# Patient Record
Sex: FEMALE | Race: WHITE | Employment: OTHER
[De-identification: names, ages, dates, MRNs, and addresses within clinical notes are randomized per-mention and may not be internally consistent; named-entity substitution may affect disease eponyms.]

---

## 2024-07-26 ENCOUNTER — APPOINTMENT (OUTPATIENT)
Facility: HOSPITAL | Age: 89
End: 2024-07-26
Payer: MEDICARE

## 2024-07-26 ENCOUNTER — APPOINTMENT (OUTPATIENT)
Facility: HOSPITAL | Age: 89
End: 2024-07-26
Attending: EMERGENCY MEDICINE
Payer: MEDICARE

## 2024-07-26 ENCOUNTER — HOSPITAL ENCOUNTER (EMERGENCY)
Facility: HOSPITAL | Age: 89
Discharge: HOME | End: 2024-07-26
Attending: EMERGENCY MEDICINE
Payer: MEDICARE

## 2024-07-26 VITALS
HEART RATE: 80 BPM | DIASTOLIC BLOOD PRESSURE: 76 MMHG | SYSTOLIC BLOOD PRESSURE: 140 MMHG | HEIGHT: 61 IN | WEIGHT: 145 LBS | BODY MASS INDEX: 27.38 KG/M2 | RESPIRATION RATE: 16 BRPM | TEMPERATURE: 98 F | OXYGEN SATURATION: 96 %

## 2024-07-26 DIAGNOSIS — S16.1XXA ACUTE STRAIN OF NECK MUSCLE, INITIAL ENCOUNTER: ICD-10-CM

## 2024-07-26 DIAGNOSIS — S80.01XA CONTUSION OF RIGHT KNEE, INITIAL ENCOUNTER: Primary | ICD-10-CM

## 2024-07-26 PROCEDURE — 70450 CT HEAD/BRAIN W/O DYE: CPT

## 2024-07-26 PROCEDURE — 71045 X-RAY EXAM CHEST 1 VIEW: CPT

## 2024-07-26 PROCEDURE — 72170 X-RAY EXAM OF PELVIS: CPT

## 2024-07-26 PROCEDURE — 73562 X-RAY EXAM OF KNEE 3: CPT

## 2024-07-26 PROCEDURE — 99284 EMERGENCY DEPT VISIT MOD MDM: CPT

## 2024-07-26 PROCEDURE — 72125 CT NECK SPINE W/O DYE: CPT

## 2024-07-26 RX ORDER — ATENOLOL 25 MG/1
25 TABLET ORAL DAILY
COMMUNITY
Start: 2024-05-08

## 2024-07-26 RX ORDER — LIDOCAINE 50 MG/G
1 PATCH TOPICAL DAILY
Qty: 10 PATCH | Refills: 0 | Status: SHIPPED | OUTPATIENT
Start: 2024-07-26 | End: 2024-08-05

## 2024-07-26 RX ORDER — CLONIDINE HYDROCHLORIDE 0.1 MG/1
0.1 TABLET ORAL EVERY 6 HOURS PRN
COMMUNITY

## 2024-07-26 RX ORDER — ENOXAPARIN SODIUM 300 MG/3ML
40 INJECTION INTRAVENOUS; SUBCUTANEOUS DAILY
COMMUNITY

## 2024-07-26 RX ORDER — LATANOPROST 50 UG/ML
1 SOLUTION/ DROPS OPHTHALMIC NIGHTLY
COMMUNITY

## 2024-07-26 RX ORDER — DILTIAZEM HYDROCHLORIDE 180 MG/1
180 CAPSULE, COATED, EXTENDED RELEASE ORAL DAILY
COMMUNITY
Start: 2024-07-08

## 2024-07-26 RX ORDER — ONDANSETRON 4 MG/1
4 TABLET, ORALLY DISINTEGRATING ORAL EVERY 6 HOURS PRN
COMMUNITY

## 2024-07-26 NOTE — ED PROVIDER NOTES
Place 1 patch onto the skin daily for 10 days 12 hours on, 12 hours off. 10 patch 0       Social Determinants of Health:   Social Determinants of Health     Tobacco Use: Not on file   Alcohol Use: Not At Risk (7/26/2024)    AUDIT-C     Frequency of Alcohol Consumption: Never     Average Number of Drinks: Patient does not drink     Frequency of Binge Drinking: Never   Financial Resource Strain: Not on file   Food Insecurity: Not on file   Transportation Needs: Not on file   Physical Activity: Not on file   Stress: Not on file   Social Connections: Not on file   Intimate Partner Violence: Not on file   Depression: Not on file   Housing Stability: Not on file   Interpersonal Safety: Not on file   Utilities: Not on file       PHYSICAL EXAM   Physical Exam  Constitutional:       Appearance: Normal appearance.   HENT:      Head: Normocephalic and atraumatic.      Right Ear: External ear normal.      Left Ear: External ear normal.      Nose: Nose normal.      Mouth/Throat:      Mouth: Mucous membranes are moist.   Eyes:      Extraocular Movements: Extraocular movements intact.      Conjunctiva/sclera: Conjunctivae normal.      Pupils: Pupils are equal, round, and reactive to light.   Neck:      Vascular: No carotid bruit.   Cardiovascular:      Rate and Rhythm: Normal rate and regular rhythm.      Pulses: Normal pulses.      Heart sounds: Normal heart sounds.   Pulmonary:      Effort: Pulmonary effort is normal.      Breath sounds: Normal breath sounds.   Abdominal:      General: Abdomen is flat. Bowel sounds are normal. There is no distension.      Palpations: Abdomen is soft. There is no mass.      Tenderness: There is no abdominal tenderness.      Hernia: No hernia is present.   Musculoskeletal:         General: No swelling, tenderness or signs of injury. Normal range of motion.      Cervical back: Normal range of motion and neck supple. No rigidity or tenderness.   Lymphadenopathy:      Cervical: No cervical

## 2024-08-04 ENCOUNTER — HOSPITAL ENCOUNTER (INPATIENT)
Facility: HOSPITAL | Age: 89
LOS: 3 days | Discharge: NURSING, REHAB OR LONG-TERM CARE | DRG: 394 | End: 2024-08-07
Admitting: INTERNAL MEDICINE
Payer: MEDICARE

## 2024-08-04 DIAGNOSIS — I48.11 LONGSTANDING PERSISTENT ATRIAL FIBRILLATION (HCC): ICD-10-CM

## 2024-08-04 DIAGNOSIS — K94.20 PROBLEM WITH GASTROSTOMY TUBE (HCC): Primary | ICD-10-CM

## 2024-08-04 PROBLEM — K94.23 GASTROSTOMY TUBE DYSFUNCTION (HCC): Status: ACTIVE | Noted: 2024-08-04

## 2024-08-04 PROBLEM — I48.21 PERMANENT ATRIAL FIBRILLATION (HCC): Status: ACTIVE | Noted: 2024-08-04

## 2024-08-04 LAB
ANION GAP SERPL CALC-SCNC: 5 MMOL/L (ref 5–15)
BASOPHILS # BLD: 0.1 K/UL (ref 0–0.1)
BASOPHILS NFR BLD: 1 % (ref 0–1)
BUN SERPL-MCNC: 25 MG/DL (ref 6–20)
BUN/CREAT SERPL: 37 (ref 12–20)
CA-I BLD-MCNC: 9.8 MG/DL (ref 8.5–10.1)
CHLORIDE SERPL-SCNC: 105 MMOL/L (ref 97–108)
CO2 SERPL-SCNC: 26 MMOL/L (ref 21–32)
CREAT SERPL-MCNC: 0.68 MG/DL (ref 0.55–1.02)
DIFFERENTIAL METHOD BLD: ABNORMAL
EOSINOPHIL # BLD: 0.1 K/UL (ref 0–0.4)
EOSINOPHIL NFR BLD: 1 % (ref 0–7)
ERYTHROCYTE [DISTWIDTH] IN BLOOD BY AUTOMATED COUNT: 13.1 % (ref 11.5–14.5)
GLUCOSE SERPL-MCNC: 113 MG/DL (ref 65–100)
HCT VFR BLD AUTO: 40.2 % (ref 35–47)
HGB BLD-MCNC: 13.2 G/DL (ref 11.5–16)
IMM GRANULOCYTES # BLD AUTO: 0 K/UL (ref 0–0.04)
IMM GRANULOCYTES NFR BLD AUTO: 0 % (ref 0–0.5)
LYMPHOCYTES # BLD: 1.2 K/UL (ref 0.8–3.5)
LYMPHOCYTES NFR BLD: 12 % (ref 12–49)
MAGNESIUM SERPL-MCNC: 2.3 MG/DL (ref 1.6–2.4)
MCH RBC QN AUTO: 31.5 PG (ref 26–34)
MCHC RBC AUTO-ENTMCNC: 32.8 G/DL (ref 30–36.5)
MCV RBC AUTO: 95.9 FL (ref 80–99)
MONOCYTES # BLD: 0.8 K/UL (ref 0–1)
MONOCYTES NFR BLD: 9 % (ref 5–13)
NEUTS SEG # BLD: 7.3 K/UL (ref 1.8–8)
NEUTS SEG NFR BLD: 77 % (ref 32–75)
NRBC # BLD: 0 K/UL (ref 0–0.01)
NRBC BLD-RTO: 0 PER 100 WBC
PLATELET # BLD AUTO: 386 K/UL (ref 150–400)
PMV BLD AUTO: 9.5 FL (ref 8.9–12.9)
POTASSIUM SERPL-SCNC: 5.3 MMOL/L (ref 3.5–5.1)
RBC # BLD AUTO: 4.19 M/UL (ref 3.8–5.2)
SODIUM SERPL-SCNC: 136 MMOL/L (ref 136–145)
WBC # BLD AUTO: 9.5 K/UL (ref 3.6–11)

## 2024-08-04 PROCEDURE — 96361 HYDRATE IV INFUSION ADD-ON: CPT

## 2024-08-04 PROCEDURE — 2580000003 HC RX 258

## 2024-08-04 PROCEDURE — 96360 HYDRATION IV INFUSION INIT: CPT

## 2024-08-04 PROCEDURE — 83735 ASSAY OF MAGNESIUM: CPT

## 2024-08-04 PROCEDURE — 99285 EMERGENCY DEPT VISIT HI MDM: CPT

## 2024-08-04 PROCEDURE — 80048 BASIC METABOLIC PNL TOTAL CA: CPT

## 2024-08-04 PROCEDURE — 85025 COMPLETE CBC W/AUTO DIFF WBC: CPT

## 2024-08-04 PROCEDURE — 93005 ELECTROCARDIOGRAM TRACING: CPT

## 2024-08-04 PROCEDURE — 36415 COLL VENOUS BLD VENIPUNCTURE: CPT

## 2024-08-04 PROCEDURE — 94761 N-INVAS EAR/PLS OXIMETRY MLT: CPT

## 2024-08-04 PROCEDURE — 1100000000 HC RM PRIVATE

## 2024-08-04 RX ORDER — SODIUM CHLORIDE 9 MG/ML
INJECTION, SOLUTION INTRAVENOUS CONTINUOUS
Status: DISCONTINUED | OUTPATIENT
Start: 2024-08-04 | End: 2024-08-07 | Stop reason: HOSPADM

## 2024-08-04 RX ORDER — METOPROLOL TARTRATE 1 MG/ML
5 INJECTION, SOLUTION INTRAVENOUS EVERY 6 HOURS PRN
Status: DISCONTINUED | OUTPATIENT
Start: 2024-08-04 | End: 2024-08-06

## 2024-08-04 RX ORDER — 0.9 % SODIUM CHLORIDE 0.9 %
500 INTRAVENOUS SOLUTION INTRAVENOUS ONCE
Status: COMPLETED | OUTPATIENT
Start: 2024-08-04 | End: 2024-08-04

## 2024-08-04 RX ADMIN — SODIUM CHLORIDE: 9 INJECTION, SOLUTION INTRAVENOUS at 22:21

## 2024-08-04 RX ADMIN — SODIUM CHLORIDE 500 ML: 9 INJECTION, SOLUTION INTRAVENOUS at 20:26

## 2024-08-04 ASSESSMENT — LIFESTYLE VARIABLES
HOW OFTEN DO YOU HAVE A DRINK CONTAINING ALCOHOL: NEVER
HOW MANY STANDARD DRINKS CONTAINING ALCOHOL DO YOU HAVE ON A TYPICAL DAY: PATIENT DOES NOT DRINK

## 2024-08-04 NOTE — ED TRIAGE NOTES
Patient reports via ambulance for peg tube clogged from nursing home.   EMS EKG found afib en route; stable. Currently on Diltiazem.

## 2024-08-05 ENCOUNTER — APPOINTMENT (OUTPATIENT)
Facility: HOSPITAL | Age: 89
DRG: 394 | End: 2024-08-05
Payer: MEDICARE

## 2024-08-05 LAB
ANION GAP SERPL CALC-SCNC: 5 MMOL/L (ref 5–15)
BASOPHILS # BLD: 0.1 K/UL (ref 0–0.1)
BASOPHILS NFR BLD: 1 % (ref 0–1)
BUN SERPL-MCNC: 27 MG/DL (ref 6–20)
BUN/CREAT SERPL: 39 (ref 12–20)
CA-I BLD-MCNC: 9.8 MG/DL (ref 8.5–10.1)
CHLORIDE SERPL-SCNC: 107 MMOL/L (ref 97–108)
CO2 SERPL-SCNC: 25 MMOL/L (ref 21–32)
CREAT SERPL-MCNC: 0.69 MG/DL (ref 0.55–1.02)
DIFFERENTIAL METHOD BLD: ABNORMAL
EOSINOPHIL # BLD: 0.1 K/UL (ref 0–0.4)
EOSINOPHIL NFR BLD: 1 % (ref 0–7)
ERYTHROCYTE [DISTWIDTH] IN BLOOD BY AUTOMATED COUNT: 13.2 % (ref 11.5–14.5)
GLUCOSE SERPL-MCNC: 114 MG/DL (ref 65–100)
HCT VFR BLD AUTO: 39.9 % (ref 35–47)
HGB BLD-MCNC: 12.9 G/DL (ref 11.5–16)
IMM GRANULOCYTES # BLD AUTO: 0 K/UL (ref 0–0.04)
IMM GRANULOCYTES NFR BLD AUTO: 0 % (ref 0–0.5)
LYMPHOCYTES # BLD: 1.1 K/UL (ref 0.8–3.5)
LYMPHOCYTES NFR BLD: 10 % (ref 12–49)
MCH RBC QN AUTO: 31.7 PG (ref 26–34)
MCHC RBC AUTO-ENTMCNC: 32.3 G/DL (ref 30–36.5)
MCV RBC AUTO: 98 FL (ref 80–99)
MONOCYTES # BLD: 0.9 K/UL (ref 0–1)
MONOCYTES NFR BLD: 8 % (ref 5–13)
NEUTS SEG # BLD: 8.6 K/UL (ref 1.8–8)
NEUTS SEG NFR BLD: 80 % (ref 32–75)
NRBC # BLD: 0 K/UL (ref 0–0.01)
NRBC BLD-RTO: 0 PER 100 WBC
PLATELET # BLD AUTO: 376 K/UL (ref 150–400)
PMV BLD AUTO: 10 FL (ref 8.9–12.9)
POTASSIUM SERPL-SCNC: 4.3 MMOL/L (ref 3.5–5.1)
RBC # BLD AUTO: 4.07 M/UL (ref 3.8–5.2)
SODIUM SERPL-SCNC: 137 MMOL/L (ref 136–145)
WBC # BLD AUTO: 10.7 K/UL (ref 3.6–11)

## 2024-08-05 PROCEDURE — 94761 N-INVAS EAR/PLS OXIMETRY MLT: CPT

## 2024-08-05 PROCEDURE — 2580000003 HC RX 258: Performed by: INTERNAL MEDICINE

## 2024-08-05 PROCEDURE — 85025 COMPLETE CBC W/AUTO DIFF WBC: CPT

## 2024-08-05 PROCEDURE — 80048 BASIC METABOLIC PNL TOTAL CA: CPT

## 2024-08-05 PROCEDURE — 74018 RADEX ABDOMEN 1 VIEW: CPT

## 2024-08-05 PROCEDURE — 1100000000 HC RM PRIVATE

## 2024-08-05 PROCEDURE — 36415 COLL VENOUS BLD VENIPUNCTURE: CPT

## 2024-08-05 PROCEDURE — 6360000002 HC RX W HCPCS: Performed by: INTERNAL MEDICINE

## 2024-08-05 PROCEDURE — 6360000004 HC RX CONTRAST MEDICATION: Performed by: INTERNAL MEDICINE

## 2024-08-05 RX ORDER — HYDROMORPHONE HYDROCHLORIDE 1 MG/ML
0.5 INJECTION, SOLUTION INTRAMUSCULAR; INTRAVENOUS; SUBCUTANEOUS EVERY 4 HOURS PRN
Status: DISPENSED | OUTPATIENT
Start: 2024-08-05 | End: 2024-08-07

## 2024-08-05 RX ORDER — POLYETHYLENE GLYCOL 3350 17 G/17G
17 POWDER, FOR SOLUTION ORAL DAILY PRN
Status: DISCONTINUED | OUTPATIENT
Start: 2024-08-05 | End: 2024-08-07 | Stop reason: HOSPADM

## 2024-08-05 RX ORDER — SODIUM CHLORIDE 0.9 % (FLUSH) 0.9 %
5-40 SYRINGE (ML) INJECTION EVERY 12 HOURS SCHEDULED
Status: DISCONTINUED | OUTPATIENT
Start: 2024-08-05 | End: 2024-08-07 | Stop reason: HOSPADM

## 2024-08-05 RX ORDER — POTASSIUM CHLORIDE 20 MEQ/1
40 TABLET, EXTENDED RELEASE ORAL PRN
Status: DISCONTINUED | OUTPATIENT
Start: 2024-08-05 | End: 2024-08-07 | Stop reason: HOSPADM

## 2024-08-05 RX ORDER — ACETAMINOPHEN 650 MG/1
650 SUPPOSITORY RECTAL EVERY 6 HOURS PRN
Status: DISCONTINUED | OUTPATIENT
Start: 2024-08-05 | End: 2024-08-07 | Stop reason: HOSPADM

## 2024-08-05 RX ORDER — ONDANSETRON 4 MG/1
4 TABLET, ORALLY DISINTEGRATING ORAL EVERY 8 HOURS PRN
Status: DISCONTINUED | OUTPATIENT
Start: 2024-08-05 | End: 2024-08-06

## 2024-08-05 RX ORDER — ACETAMINOPHEN 325 MG/1
650 TABLET ORAL EVERY 6 HOURS PRN
Status: DISCONTINUED | OUTPATIENT
Start: 2024-08-05 | End: 2024-08-07 | Stop reason: HOSPADM

## 2024-08-05 RX ORDER — POTASSIUM CHLORIDE 7.45 MG/ML
10 INJECTION INTRAVENOUS PRN
Status: DISCONTINUED | OUTPATIENT
Start: 2024-08-05 | End: 2024-08-07 | Stop reason: HOSPADM

## 2024-08-05 RX ORDER — SODIUM CHLORIDE 0.9 % (FLUSH) 0.9 %
5-40 SYRINGE (ML) INJECTION PRN
Status: DISCONTINUED | OUTPATIENT
Start: 2024-08-05 | End: 2024-08-07 | Stop reason: HOSPADM

## 2024-08-05 RX ORDER — ONDANSETRON 2 MG/ML
4 INJECTION INTRAMUSCULAR; INTRAVENOUS EVERY 6 HOURS PRN
Status: DISCONTINUED | OUTPATIENT
Start: 2024-08-05 | End: 2024-08-07 | Stop reason: HOSPADM

## 2024-08-05 RX ORDER — SODIUM CHLORIDE 9 MG/ML
INJECTION, SOLUTION INTRAVENOUS PRN
Status: DISCONTINUED | OUTPATIENT
Start: 2024-08-05 | End: 2024-08-07 | Stop reason: HOSPADM

## 2024-08-05 RX ORDER — MAGNESIUM SULFATE IN WATER 40 MG/ML
2000 INJECTION, SOLUTION INTRAVENOUS PRN
Status: DISCONTINUED | OUTPATIENT
Start: 2024-08-05 | End: 2024-08-07 | Stop reason: HOSPADM

## 2024-08-05 RX ADMIN — DIATRIZOATE MEGLUMINE AND DIATRIZOATE SODIUM 30 ML: 660; 100 LIQUID ORAL; RECTAL at 15:11

## 2024-08-05 RX ADMIN — SODIUM CHLORIDE, PRESERVATIVE FREE 10 ML: 5 INJECTION INTRAVENOUS at 22:33

## 2024-08-05 RX ADMIN — SODIUM CHLORIDE, PRESERVATIVE FREE 10 ML: 5 INJECTION INTRAVENOUS at 09:12

## 2024-08-05 RX ADMIN — HYDROMORPHONE HYDROCHLORIDE 0.5 MG: 1 INJECTION, SOLUTION INTRAMUSCULAR; INTRAVENOUS; SUBCUTANEOUS at 09:18

## 2024-08-05 ASSESSMENT — PAIN DESCRIPTION - LOCATION
LOCATION: BUTTOCKS
LOCATION: ABDOMEN

## 2024-08-05 ASSESSMENT — PAIN DESCRIPTION - DESCRIPTORS: DESCRIPTORS: ACHING

## 2024-08-05 ASSESSMENT — PAIN SCALES - GENERAL
PAINLEVEL_OUTOF10: 8
PAINLEVEL_OUTOF10: 8
PAINLEVEL_OUTOF10: 6

## 2024-08-05 NOTE — PLAN OF CARE
Problem: Discharge Planning  Goal: Discharge to home or other facility with appropriate resources  Outcome: Progressing     Problem: Skin/Tissue Integrity  Goal: Absence of new skin breakdown  Description: 1.  Monitor for areas of redness and/or skin breakdown  2.  Assess vascular access sites hourly  3.  Every 4-6 hours minimum:  Change oxygen saturation probe site  4.  Every 4-6 hours:  If on nasal continuous positive airway pressure, respiratory therapy assess nares and determine need for appliance change or resting period.  Outcome: Progressing     Problem: ABCDS Injury Assessment  Goal: Absence of physical injury  Outcome: Progressing     Problem: Neurosensory - Adult  Goal: Achieves stable or improved neurological status  Outcome: Progressing     Problem: Cardiovascular - Adult  Goal: Maintains optimal cardiac output and hemodynamic stability  Outcome: Progressing  Goal: Absence of cardiac dysrhythmias or at baseline  Outcome: Progressing     Problem: Skin/Tissue Integrity - Adult  Goal: Incisions, wounds, or drain sites healing without S/S of infection  Outcome: Progressing     Problem: Musculoskeletal - Adult  Goal: Return mobility to safest level of function  Outcome: Progressing     Problem: Genitourinary - Adult  Goal: Absence of urinary retention  Outcome: Progressing  Goal: Urinary catheter remains patent  Outcome: Progressing

## 2024-08-05 NOTE — ED PROVIDER NOTES
Comments: G-tube site is clean and intact   Musculoskeletal:         General: No swelling or signs of injury.      Cervical back: Neck supple.   Skin:     General: Skin is warm and dry.   Neurological:      Mental Status: She is alert. Mental status is at baseline.           SCREENINGS                No data recorded    LAB, EKG AND DIAGNOSTIC RESULTS   Labs:  Recent Results (from the past 12 hour(s))   BMP    Collection Time: 08/04/24  8:24 PM   Result Value Ref Range    Sodium 136 136 - 145 mmol/L    Potassium 5.3 (H) 3.5 - 5.1 mmol/L    Chloride 105 97 - 108 mmol/L    CO2 26 21 - 32 mmol/L    Anion Gap 5 5 - 15 mmol/L    Glucose 113 (H) 65 - 100 mg/dL    BUN 25 (H) 6 - 20 mg/dL    Creatinine 0.68 0.55 - 1.02 mg/dL    BUN/Creatinine Ratio 37 (H) 12 - 20      Est, Glom Filt Rate 83 >60 ml/min/1.73m2    Calcium 9.8 8.5 - 10.1 mg/dL   CBC with Auto Differential    Collection Time: 08/04/24  8:24 PM   Result Value Ref Range    WBC 9.5 3.6 - 11.0 K/uL    RBC 4.19 3.80 - 5.20 M/uL    Hemoglobin 13.2 11.5 - 16.0 g/dL    Hematocrit 40.2 35.0 - 47.0 %    MCV 95.9 80.0 - 99.0 FL    MCH 31.5 26.0 - 34.0 PG    MCHC 32.8 30.0 - 36.5 g/dL    RDW 13.1 11.5 - 14.5 %    Platelets 386 150 - 400 K/uL    MPV 9.5 8.9 - 12.9 FL    Nucleated RBCs 0.0 0.0  WBC    nRBC 0.00 0.00 - 0.01 K/uL    Neutrophils % 77 (H) 32 - 75 %    Lymphocytes % 12 12 - 49 %    Monocytes % 9 5 - 13 %    Eosinophils % 1 0 - 7 %    Basophils % 1 0 - 1 %    Immature Granulocytes % 0 0 - 0.5 %    Neutrophils Absolute 7.3 1.8 - 8.0 K/UL    Lymphocytes Absolute 1.2 0.8 - 3.5 K/UL    Monocytes Absolute 0.8 0.0 - 1.0 K/UL    Eosinophils Absolute 0.1 0.0 - 0.4 K/UL    Basophils Absolute 0.1 0.0 - 0.1 K/UL    Immature Granulocytes Absolute 0.0 0.00 - 0.04 K/UL    Differential Type AUTOMATED     Magnesium    Collection Time: 08/04/24  8:24 PM   Result Value Ref Range    Magnesium 2.3 1.6 - 2.4 mg/dL       EKG:.EKG interpreted by me. Shows atrial fibrillation with

## 2024-08-05 NOTE — ED NOTES
ED TO INPATIENT SBAR HANDOFF    Patient Name: Ciarra Rutherford   Preferred Name: Ciarra  : 1934  89 y.o.   Family/Caregiver Present: no   Code Status Order: No Order  PO Status: NPO:Yes  Telemetry Order:   C-SSRS: Risk of Suicide: No Risk  Sitter no   Restraints:     Sepsis Risk Score      Situation  Chief Complaint   Patient presents with    Peg Tube Clogged     Brief Description of Patient's Condition: Stable; PEG tube clogged. Per nursing home (via phone) PEG tube feeding was running via pump at 5p yesterday and they lost power. They are unsure when the feeding stopped and the tube has not been flushed since that time. No meds given; no flushes; no bolus feedings, or feedings via gravity. Nurse reports that they offered patient PO meds, but patient refused stating she cannot have meds by mouth.   Mental Status: oriented, alert, and coherent with moments of confusion  Arrived from:Assisted Living  Imaging:   No orders to display     Abnormal labs:   Abnormal Labs Reviewed   BASIC METABOLIC PANEL - Abnormal; Notable for the following components:       Result Value    Potassium 5.3 (*)     Glucose 113 (*)     BUN 25 (*)     BUN/Creatinine Ratio 37 (*)     All other components within normal limits   CBC WITH AUTO DIFFERENTIAL - Abnormal; Notable for the following components:    Neutrophils % 77 (*)     All other components within normal limits       Background  Allergies:   Allergies   Allergen Reactions    Iodine Swelling     History: No past medical history on file.    Assessment  Vitals:      There were no vitals filed for this visit.  Deterioration Index (DI): Deterioration Index: 26.73  Deterioration Index (DI) Interventions Performed:    O2 Flow Rate:    O2 Device:    Cardiac Rhythm:    Critical Lab Results: [unfilled]  Cultures: Cultures: None  NIH Score: NIH     Active LDA's:   Peripheral IV 24 Distal;Left;Anterior Forearm (Active)     Active Central Lines:                          Active Wounds:

## 2024-08-05 NOTE — CONSULTS
responsibility of hospitalitis/admitting physician to forward the discharge summary to patient's primary care physician and the primary gastroenterologist regarding further follow-up plan after patient be discharged.    note to patient:  The 21 st century Cures Act make the medical notes like this available to patient in the interest of transparency, however please be advised this is a medical document.  It is intended  as peer to peer communication. It is written in the medical language and may contain abbreviation or verbiage that are unfamiliar. It may appear blunt or direct.  Medical documents are intended to carry relevant information, facts as evident.  And the clinic opinions of the practitioner.  This note maybe transcribed  using a voice dictation system, voice-recognition errors may occur, this should not be taken to alter the contents or meaning for this note.      Cc Referring Physician

## 2024-08-05 NOTE — CARE COORDINATION
08/05/24 1153   Service Assessment   Patient Orientation Alert and Oriented   Cognition Alert   History Provided By Significant Other   Primary Caregiver Other (Comment)  (Patient was at SNF for 2-3 weeks per spouse)   Support Systems Spouse/Significant Other   Patient's Healthcare Decision Maker is: Legal Next of Kin   PCP Verified by CM Yes   Last Visit to PCP Within last 6 months   Prior Functional Level Assistance with the following:;Mobility   Current Functional Level Assistance with the following:;Bathing;Dressing;Toileting;Feeding;Cooking;Housework;Mobility;Shopping   Can patient return to prior living arrangement Yes   Ability to make needs known: Fair   Family able to assist with home care needs: Yes   Would you like for me to discuss the discharge plan with any other family members/significant others, and if so, who? Yes     Advance Care Planning     General Advance Care Planning (ACP) Conversation    Date of Conversation: 8/5/2024  Conducted with: Patient with Decision Making Capacity  Other persons present: None    Healthcare Decision Maker:   Primary Decision Maker: TJMARNIE CARO - Spouse - [phone]         Length of Voluntary ACP Conversation in minutes:  <16 minutes (Non-Billable)    Varinder Gill RN CM called YOLANDA Rutherford [phone].  He stated the patient was at a hospital for gall bladder surgery. Came home for 2 days, had difficulty swallowing, and went back to the hospital.  Then from there she went to [facility] for rehab.  Prior to hospitalizations she was at home at address on facesheet.  She did use a walker per spouse.  Spouse would like for patient to return to [facility] when discharged from the hospital. CM sent referral. Pending acceptance at this time.

## 2024-08-05 NOTE — H&P
V2.0  History and Physical      Name:  Ciarra Rutherford /Age/Sex: 1934  (89 y.o. female)   MRN & CSN:  [id number] & [id number] Encounter Date/Time: 2024 11:12 PM EDT   Location:  0303 PCP: None, None       Hospital Day: 1    Assessment and Plan:   Ciarra Rutherford is a 89 y.o. female with pmh of Parkinson's disease, hypertension, atrial fibrillation who presents with malfunction of G-tube from nursing home.     Hospital Problems             Last Modified POA    * (Principal) Gastrostomy tube dysfunction (HCC) 2024 Yes    Permanent atrial fibrillation (HCC) 2024 Yes         Principal Problem:    Gastrostomy tube dysfunction (HCC)  Plan:   Consult gastroenterology  N.p.o.    Active Problems:    Permanent atrial fibrillation (HCC)  Plan:   Appears patient is anticoagulated with subcu Lovenox.  In case procedure is necessary we will hold.  Monitor on telemetry  Lopressor 5 mg as needed for tachycardia       Admit to 23 Hour    Disposition:   Current Living situation: Nursing home  Expected Disposition: Nursing home  Estimated D/C: 1 day    Diet N.p.o.   DVT Prophylaxis [] Lovenox, []  Heparin, [x] SCDs, [] Ambulation,  [] Eliquis, [] Xarelto, [] Coumadin   Code Status DNR per nursing home paperwork   Surrogate Decision Maker/ POA Unknown     Personally reviewed Lab Studies and Imaging     Discussed management with the ED provider and agree with the plan for hospitalization    Bedside telemetry per my interpretation shows atrial fibrillation with a rate of 120+ per minute      History from:     patient, Quality of history:  vague    Prior records reviewed include prior office visits recorded in [facility] and nursing home paperwork      History of Present Illness:     Chief Complaint: G-tube malfunction    Ciarra Rutherford is a 89 y.o. female with pmh of Parkinson's disease, hypertension, atrial fibrillation who presents with malfunction of G-tube from nursing home.  Report from ER providers that family

## 2024-08-05 NOTE — PLAN OF CARE
Problem: Discharge Planning  Goal: Discharge to home or other facility with appropriate resources  8/5/2024 1324 by Harriet Adler RN  Outcome: Progressing  8/5/2024 0204 by Barbara Mon RN  Outcome: Progressing     Problem: Skin/Tissue Integrity  Goal: Absence of new skin breakdown  Description: 1.  Monitor for areas of redness and/or skin breakdown  2.  Assess vascular access sites hourly  3.  Every 4-6 hours minimum:  Change oxygen saturation probe site  4.  Every 4-6 hours:  If on nasal continuous positive airway pressure, respiratory therapy assess nares and determine need for appliance change or resting period.  8/5/2024 1324 by Harriet Adler RN  Outcome: Progressing  8/5/2024 0204 by Barbara Mon RN  Outcome: Progressing     Problem: ABCDS Injury Assessment  Goal: Absence of physical injury  8/5/2024 1324 by Harriet Adler RN  Outcome: Progressing  8/5/2024 0204 by Babrara Mon RN  Outcome: Progressing     Problem: Neurosensory - Adult  Goal: Achieves stable or improved neurological status  8/5/2024 1324 by Harriet Adler RN  Outcome: Progressing  8/5/2024 0204 by Barbara Mon RN  Outcome: Progressing     Problem: Cardiovascular - Adult  Goal: Maintains optimal cardiac output and hemodynamic stability  8/5/2024 1324 by Harriet Adler RN  Outcome: Progressing  8/5/2024 0204 by Barbara Mon RN  Outcome: Progressing  Goal: Absence of cardiac dysrhythmias or at baseline  8/5/2024 1324 by Harriet Adler RN  Outcome: Progressing  8/5/2024 0204 by Barbara Mon RN  Outcome: Progressing     Problem: Skin/Tissue Integrity - Adult  Goal: Incisions, wounds, or drain sites healing without S/S of infection  8/5/2024 1324 by Harriet Adler RN  Outcome: Progressing  8/5/2024 0204 by Barbara Mon RN  Outcome: Progressing     Problem: Musculoskeletal - Adult  Goal: Return mobility to safest level of

## 2024-08-06 ENCOUNTER — APPOINTMENT (OUTPATIENT)
Facility: HOSPITAL | Age: 89
DRG: 394 | End: 2024-08-06
Attending: INTERNAL MEDICINE
Payer: MEDICARE

## 2024-08-06 LAB
ALBUMIN SERPL-MCNC: 2.6 G/DL (ref 3.5–5)
ALBUMIN/GLOB SERPL: 0.6 (ref 1.1–2.2)
ALP SERPL-CCNC: 78 U/L (ref 45–117)
ALT SERPL-CCNC: 34 U/L (ref 12–78)
ANION GAP SERPL CALC-SCNC: 7 MMOL/L (ref 5–15)
AST SERPL W P-5'-P-CCNC: 20 U/L (ref 15–37)
BASOPHILS # BLD: 0.1 K/UL (ref 0–0.1)
BASOPHILS NFR BLD: 1 % (ref 0–1)
BILIRUB DIRECT SERPL-MCNC: 0.3 MG/DL (ref 0–0.2)
BILIRUB SERPL-MCNC: 0.9 MG/DL (ref 0.2–1)
BUN SERPL-MCNC: 29 MG/DL (ref 6–20)
BUN/CREAT SERPL: 41 (ref 12–20)
CA-I BLD-MCNC: 9.8 MG/DL (ref 8.5–10.1)
CHLORIDE SERPL-SCNC: 109 MMOL/L (ref 97–108)
CO2 SERPL-SCNC: 23 MMOL/L (ref 21–32)
CREAT SERPL-MCNC: 0.7 MG/DL (ref 0.55–1.02)
DIFFERENTIAL METHOD BLD: ABNORMAL
ECHO AO ASC DIAM: 2.2 CM
ECHO AO ASCENDING AORTA INDEX: 1.33 CM/M2
ECHO AO ROOT DIAM: 2.9 CM
ECHO AO ROOT INDEX: 1.76 CM/M2
ECHO AV AREA PEAK VELOCITY: 1.5 CM2
ECHO AV AREA VTI: 1.3 CM2
ECHO AV AREA/BSA PEAK VELOCITY: 0.9 CM2/M2
ECHO AV AREA/BSA VTI: 0.8 CM2/M2
ECHO AV MEAN GRADIENT: 8 MMHG
ECHO AV MEAN VELOCITY: 1.3 M/S
ECHO AV PEAK GRADIENT: 12 MMHG
ECHO AV PEAK VELOCITY: 1.7 M/S
ECHO AV VELOCITY RATIO: 0.41
ECHO AV VTI: 29.2 CM
ECHO BSA: 1.68 M2
ECHO EST RA PRESSURE: 8 MMHG
ECHO LA AREA 4C: 12 CM2
ECHO LA DIAMETER INDEX: 2.79 CM/M2
ECHO LA DIAMETER: 4.6 CM
ECHO LA MAJOR AXIS: 4.2 CM
ECHO LA TO AORTIC ROOT RATIO: 1.59
ECHO LA VOL MOD A4C: 27 ML (ref 22–52)
ECHO LA VOLUME INDEX MOD A4C: 16 ML/M2 (ref 16–34)
ECHO LV E' LATERAL VELOCITY: 11 CM/S
ECHO LV EDV A4C: 35 ML
ECHO LV EDV INDEX A4C: 21 ML/M2
ECHO LV EJECTION FRACTION A4C: 61 %
ECHO LV ESV A4C: 14 ML
ECHO LV ESV INDEX A4C: 8 ML/M2
ECHO LV FRACTIONAL SHORTENING: 29 % (ref 28–44)
ECHO LV INTERNAL DIMENSION DIASTOLE INDEX: 1.88 CM/M2
ECHO LV INTERNAL DIMENSION DIASTOLIC: 3.1 CM (ref 3.9–5.3)
ECHO LV INTERNAL DIMENSION SYSTOLIC INDEX: 1.33 CM/M2
ECHO LV INTERNAL DIMENSION SYSTOLIC: 2.2 CM
ECHO LV IVSD: 1.1 CM (ref 0.6–0.9)
ECHO LV MASS 2D: 99.7 G (ref 67–162)
ECHO LV MASS INDEX 2D: 60.4 G/M2 (ref 43–95)
ECHO LV POSTERIOR WALL DIASTOLIC: 1.1 CM (ref 0.6–0.9)
ECHO LV RELATIVE WALL THICKNESS RATIO: 0.71
ECHO LVOT AREA: 3.5 CM2
ECHO LVOT AV VTI INDEX: 0.38
ECHO LVOT DIAM: 2.1 CM
ECHO LVOT MEAN GRADIENT: 1 MMHG
ECHO LVOT PEAK GRADIENT: 2 MMHG
ECHO LVOT PEAK VELOCITY: 0.7 M/S
ECHO LVOT STROKE VOLUME INDEX: 23.5 ML/M2
ECHO LVOT SV: 38.8 ML
ECHO LVOT VTI: 11.2 CM
ECHO MV A VELOCITY: 0.4 M/S
ECHO MV E VELOCITY: 0.94 M/S
ECHO MV E/A RATIO: 2.35
ECHO MV E/E' LATERAL: 8.55
ECHO MV E/E' RATIO (AVERAGED): 9.49
ECHO MV E/E' SEPTAL: 10.44
ECHO PULMONARY ARTERY END DIASTOLIC PRESSURE: 11 MMHG
ECHO PV MAX VELOCITY: 0.9 M/S
ECHO PV MEAN GRADIENT: 2 MMHG
ECHO PV MEAN VELOCITY: 0.6 M/S
ECHO PV PEAK GRADIENT: 3 MMHG
ECHO PV REGURGITANT MAX VELOCITY: 1.7 M/S
ECHO PV VTI: 12.3 CM
ECHO RA AREA 4C: 28.4 CM2
ECHO RA END SYSTOLIC VOLUME APICAL 4 CHAMBER INDEX BSA: 59 ML/M2
ECHO RA VOLUME: 97 ML
ECHO RIGHT VENTRICULAR SYSTOLIC PRESSURE (RVSP): 41 MMHG
ECHO RV BASAL DIMENSION: 4.6 CM
ECHO RV MID DIMENSION: 3.2 CM
ECHO TV REGURGITANT MAX VELOCITY: 2.86 M/S
ECHO TV REGURGITANT PEAK GRADIENT: 33 MMHG
EKG ATRIAL RATE: 100 BPM
EKG DIAGNOSIS: NORMAL
EKG Q-T INTERVAL: 334 MS
EKG QRS DURATION: 80 MS
EKG QTC CALCULATION (BAZETT): 475 MS
EKG R AXIS: -32 DEGREES
EKG T AXIS: -74 DEGREES
EKG VENTRICULAR RATE: 122 BPM
EOSINOPHIL # BLD: 0.1 K/UL (ref 0–0.4)
EOSINOPHIL NFR BLD: 0 % (ref 0–7)
ERYTHROCYTE [DISTWIDTH] IN BLOOD BY AUTOMATED COUNT: 13.3 % (ref 11.5–14.5)
GLOBULIN SER CALC-MCNC: 4.6 G/DL (ref 2–4)
GLUCOSE BLD STRIP.AUTO-MCNC: 105 MG/DL (ref 65–100)
GLUCOSE BLD STRIP.AUTO-MCNC: 105 MG/DL (ref 65–100)
GLUCOSE SERPL-MCNC: 123 MG/DL (ref 65–100)
HCT VFR BLD AUTO: 40.3 % (ref 35–47)
HGB BLD-MCNC: 13.1 G/DL (ref 11.5–16)
IMM GRANULOCYTES # BLD AUTO: 0 K/UL (ref 0–0.04)
IMM GRANULOCYTES NFR BLD AUTO: 0 % (ref 0–0.5)
LYMPHOCYTES # BLD: 0.9 K/UL (ref 0.8–3.5)
LYMPHOCYTES NFR BLD: 8 % (ref 12–49)
MAGNESIUM SERPL-MCNC: 2 MG/DL (ref 1.6–2.4)
MCH RBC QN AUTO: 31.8 PG (ref 26–34)
MCHC RBC AUTO-ENTMCNC: 32.5 G/DL (ref 30–36.5)
MCV RBC AUTO: 97.8 FL (ref 80–99)
MONOCYTES # BLD: 0.8 K/UL (ref 0–1)
MONOCYTES NFR BLD: 7 % (ref 5–13)
NEUTS SEG # BLD: 9.7 K/UL (ref 1.8–8)
NEUTS SEG NFR BLD: 84 % (ref 32–75)
NRBC # BLD: 0 K/UL (ref 0–0.01)
NRBC BLD-RTO: 0 PER 100 WBC
PERFORMED BY:: ABNORMAL
PERFORMED BY:: ABNORMAL
PLATELET # BLD AUTO: 305 K/UL (ref 150–400)
PMV BLD AUTO: 10.1 FL (ref 8.9–12.9)
POTASSIUM SERPL-SCNC: 4.1 MMOL/L (ref 3.5–5.1)
PROT SERPL-MCNC: 7.2 G/DL (ref 6.4–8.2)
RBC # BLD AUTO: 4.12 M/UL (ref 3.8–5.2)
SODIUM SERPL-SCNC: 139 MMOL/L (ref 136–145)
WBC # BLD AUTO: 11.4 K/UL (ref 3.6–11)

## 2024-08-06 PROCEDURE — 82962 GLUCOSE BLOOD TEST: CPT

## 2024-08-06 PROCEDURE — C8929 TTE W OR WO FOL WCON,DOPPLER: HCPCS

## 2024-08-06 PROCEDURE — 80076 HEPATIC FUNCTION PANEL: CPT

## 2024-08-06 PROCEDURE — 2580000003 HC RX 258: Performed by: INTERNAL MEDICINE

## 2024-08-06 PROCEDURE — 1100000000 HC RM PRIVATE

## 2024-08-06 PROCEDURE — 85025 COMPLETE CBC W/AUTO DIFF WBC: CPT

## 2024-08-06 PROCEDURE — 2580000003 HC RX 258

## 2024-08-06 PROCEDURE — 36415 COLL VENOUS BLD VENIPUNCTURE: CPT

## 2024-08-06 PROCEDURE — 0DP6XUZ REMOVAL OF FEEDING DEVICE FROM STOMACH, EXTERNAL APPROACH: ICD-10-PCS | Performed by: INTERNAL MEDICINE

## 2024-08-06 PROCEDURE — 80048 BASIC METABOLIC PNL TOTAL CA: CPT

## 2024-08-06 PROCEDURE — 6360000002 HC RX W HCPCS: Performed by: INTERNAL MEDICINE

## 2024-08-06 PROCEDURE — 0DH63UZ INSERTION OF FEEDING DEVICE INTO STOMACH, PERCUTANEOUS APPROACH: ICD-10-PCS | Performed by: INTERNAL MEDICINE

## 2024-08-06 PROCEDURE — 6370000000 HC RX 637 (ALT 250 FOR IP): Performed by: INTERNAL MEDICINE

## 2024-08-06 PROCEDURE — 6360000004 HC RX CONTRAST MEDICATION

## 2024-08-06 PROCEDURE — 83735 ASSAY OF MAGNESIUM: CPT

## 2024-08-06 RX ORDER — ENOXAPARIN SODIUM 100 MG/ML
40 INJECTION SUBCUTANEOUS DAILY
Status: DISCONTINUED | OUTPATIENT
Start: 2024-08-06 | End: 2024-08-07 | Stop reason: HOSPADM

## 2024-08-06 RX ORDER — ONDANSETRON 4 MG/1
4 TABLET, ORALLY DISINTEGRATING ORAL EVERY 6 HOURS PRN
Status: DISCONTINUED | OUTPATIENT
Start: 2024-08-06 | End: 2024-08-07 | Stop reason: HOSPADM

## 2024-08-06 RX ORDER — DILTIAZEM HYDROCHLORIDE 180 MG/1
180 CAPSULE, COATED, EXTENDED RELEASE ORAL DAILY
Status: DISCONTINUED | OUTPATIENT
Start: 2024-08-06 | End: 2024-08-06

## 2024-08-06 RX ORDER — ATENOLOL 25 MG/1
25 TABLET ORAL DAILY
Status: DISCONTINUED | OUTPATIENT
Start: 2024-08-06 | End: 2024-08-07 | Stop reason: HOSPADM

## 2024-08-06 RX ORDER — DILTIAZEM HYDROCHLORIDE 180 MG/1
180 CAPSULE, EXTENDED RELEASE ORAL DAILY
Status: DISCONTINUED | OUTPATIENT
Start: 2024-08-06 | End: 2024-08-07 | Stop reason: HOSPADM

## 2024-08-06 RX ORDER — CLONIDINE HYDROCHLORIDE 0.1 MG/1
0.1 TABLET ORAL EVERY 6 HOURS PRN
Status: DISCONTINUED | OUTPATIENT
Start: 2024-08-06 | End: 2024-08-07 | Stop reason: HOSPADM

## 2024-08-06 RX ORDER — ENOXAPARIN SODIUM 300 MG/3ML
40 INJECTION INTRAVENOUS; SUBCUTANEOUS DAILY
Status: DISCONTINUED | OUTPATIENT
Start: 2024-08-06 | End: 2024-08-06

## 2024-08-06 RX ORDER — LATANOPROST 50 UG/ML
1 SOLUTION/ DROPS OPHTHALMIC NIGHTLY
Status: DISCONTINUED | OUTPATIENT
Start: 2024-08-06 | End: 2024-08-07 | Stop reason: HOSPADM

## 2024-08-06 RX ADMIN — CARBIDOPA AND LEVODOPA 2 TABLET: 25; 100 TABLET ORAL at 20:24

## 2024-08-06 RX ADMIN — ATENOLOL 25 MG: 25 TABLET ORAL at 15:01

## 2024-08-06 RX ADMIN — SODIUM CHLORIDE: 9 INJECTION, SOLUTION INTRAVENOUS at 15:08

## 2024-08-06 RX ADMIN — SODIUM CHLORIDE, PRESERVATIVE FREE 10 ML: 5 INJECTION INTRAVENOUS at 20:28

## 2024-08-06 RX ADMIN — HYDROMORPHONE HYDROCHLORIDE 0.5 MG: 1 INJECTION, SOLUTION INTRAMUSCULAR; INTRAVENOUS; SUBCUTANEOUS at 23:59

## 2024-08-06 RX ADMIN — ACETAMINOPHEN 650 MG: 325 TABLET ORAL at 20:24

## 2024-08-06 RX ADMIN — CARBIDOPA AND LEVODOPA 2 TABLET: 25; 100 TABLET ORAL at 15:01

## 2024-08-06 RX ADMIN — HYDROMORPHONE HYDROCHLORIDE 0.5 MG: 1 INJECTION, SOLUTION INTRAMUSCULAR; INTRAVENOUS; SUBCUTANEOUS at 09:05

## 2024-08-06 RX ADMIN — ENOXAPARIN SODIUM 40 MG: 100 INJECTION SUBCUTANEOUS at 15:02

## 2024-08-06 RX ADMIN — SODIUM CHLORIDE, PRESERVATIVE FREE 10 ML: 5 INJECTION INTRAVENOUS at 09:05

## 2024-08-06 RX ADMIN — PERFLUTREN 1 ML: 6.52 INJECTION, SUSPENSION INTRAVENOUS at 13:36

## 2024-08-06 ASSESSMENT — PAIN SCALES - GENERAL
PAINLEVEL_OUTOF10: 6
PAINLEVEL_OUTOF10: 0
PAINLEVEL_OUTOF10: 10
PAINLEVEL_OUTOF10: 8
PAINLEVEL_OUTOF10: 6

## 2024-08-06 ASSESSMENT — PAIN DESCRIPTION - ORIENTATION: ORIENTATION: POSTERIOR

## 2024-08-06 ASSESSMENT — PAIN DESCRIPTION - DESCRIPTORS: DESCRIPTORS: ACHING

## 2024-08-06 ASSESSMENT — PAIN - FUNCTIONAL ASSESSMENT: PAIN_FUNCTIONAL_ASSESSMENT: PREVENTS OR INTERFERES SOME ACTIVE ACTIVITIES AND ADLS

## 2024-08-06 ASSESSMENT — PAIN DESCRIPTION - FREQUENCY: FREQUENCY: INTERMITTENT

## 2024-08-06 ASSESSMENT — PAIN SCALES - WONG BAKER: WONGBAKER_NUMERICALRESPONSE: NO HURT

## 2024-08-06 ASSESSMENT — PAIN DESCRIPTION - PAIN TYPE: TYPE: ACUTE PAIN

## 2024-08-06 ASSESSMENT — PAIN DESCRIPTION - ONSET: ONSET: SUDDEN

## 2024-08-06 ASSESSMENT — PAIN DESCRIPTION - LOCATION
LOCATION: GENERALIZED
LOCATION: GENERALIZED

## 2024-08-06 NOTE — PLAN OF CARE
Problem: Discharge Planning  Goal: Discharge to home or other facility with appropriate resources  Outcome: Progressing     Problem: Skin/Tissue Integrity  Goal: Absence of new skin breakdown  Description: 1.  Monitor for areas of redness and/or skin breakdown  2.  Assess vascular access sites hourly  3.  Every 4-6 hours minimum:  Change oxygen saturation probe site  4.  Every 4-6 hours:  If on nasal continuous positive airway pressure, respiratory therapy assess nares and determine need for appliance change or resting period.  Outcome: Progressing     Problem: ABCDS Injury Assessment  Goal: Absence of physical injury  Outcome: Progressing     Problem: Neurosensory - Adult  Goal: Achieves stable or improved neurological status  Outcome: Progressing     Problem: Cardiovascular - Adult  Goal: Maintains optimal cardiac output and hemodynamic stability  Outcome: Progressing  Goal: Absence of cardiac dysrhythmias or at baseline  Outcome: Progressing     Problem: Skin/Tissue Integrity - Adult  Goal: Incisions, wounds, or drain sites healing without S/S of infection  Outcome: Progressing     Problem: Musculoskeletal - Adult  Goal: Return mobility to safest level of function  Outcome: Progressing     Problem: Genitourinary - Adult  Goal: Absence of urinary retention  Outcome: Progressing  Goal: Urinary catheter remains patent  Outcome: Progressing     Problem: Safety - Adult  Goal: Free from fall injury  Outcome: Progressing     Problem: Pain  Goal: Verbalizes/displays adequate comfort level or baseline comfort level  Outcome: Progressing

## 2024-08-06 NOTE — CARE COORDINATION
SHELBIE spoke with spouse Juan R Rutherford [phone].  SHELBIE informed him that we are anticipating discharge back to [facility] tomorrow.  Mr. Rutherford verbalized understanding. Mr. Rutherford states she will need stretcher transport back to the facility. IMM was given on 8/5/24.  Room assignment at [facility] has been given: Room # 22B.

## 2024-08-06 NOTE — CARE COORDINATION
Patient has been accepted to return to [facility] when medically stable for discharge.  Patient will need 3 midnight stay to return to facility which will be tomorrow.  Patients insurance recently changed from a managed medicare plan to traditional medicare, which is why the 3 midnight stay is needed per [facility].

## 2024-08-07 VITALS
TEMPERATURE: 97.2 F | DIASTOLIC BLOOD PRESSURE: 75 MMHG | BODY MASS INDEX: 27.3 KG/M2 | SYSTOLIC BLOOD PRESSURE: 135 MMHG | RESPIRATION RATE: 20 BRPM | HEART RATE: 100 BPM | WEIGHT: 144.62 LBS | OXYGEN SATURATION: 100 % | HEIGHT: 61 IN

## 2024-08-07 PROBLEM — K94.20 PROBLEM WITH GASTROSTOMY TUBE (HCC): Status: ACTIVE | Noted: 2024-08-07

## 2024-08-07 LAB
ALBUMIN SERPL-MCNC: 2.3 G/DL (ref 3.5–5)
ALBUMIN/GLOB SERPL: 0.5 (ref 1.1–2.2)
ALP SERPL-CCNC: 72 U/L (ref 45–117)
ALT SERPL-CCNC: 10 U/L (ref 12–78)
ANION GAP SERPL CALC-SCNC: 4 MMOL/L (ref 5–15)
AST SERPL W P-5'-P-CCNC: 16 U/L (ref 15–37)
BASOPHILS # BLD: 0.1 K/UL (ref 0–0.1)
BASOPHILS NFR BLD: 1 % (ref 0–1)
BILIRUB DIRECT SERPL-MCNC: 0.3 MG/DL (ref 0–0.2)
BILIRUB SERPL-MCNC: 1 MG/DL (ref 0.2–1)
BUN SERPL-MCNC: 33 MG/DL (ref 6–20)
BUN/CREAT SERPL: 45 (ref 12–20)
CA-I BLD-MCNC: 9.6 MG/DL (ref 8.5–10.1)
CHLORIDE SERPL-SCNC: 111 MMOL/L (ref 97–108)
CO2 SERPL-SCNC: 25 MMOL/L (ref 21–32)
CREAT SERPL-MCNC: 0.73 MG/DL (ref 0.55–1.02)
DIFFERENTIAL METHOD BLD: ABNORMAL
EOSINOPHIL # BLD: 0.2 K/UL (ref 0–0.4)
EOSINOPHIL NFR BLD: 2 % (ref 0–7)
ERYTHROCYTE [DISTWIDTH] IN BLOOD BY AUTOMATED COUNT: 13.7 % (ref 11.5–14.5)
GLOBULIN SER CALC-MCNC: 4.3 G/DL (ref 2–4)
GLUCOSE SERPL-MCNC: 92 MG/DL (ref 65–100)
HCT VFR BLD AUTO: 38.4 % (ref 35–47)
HGB BLD-MCNC: 12.3 G/DL (ref 11.5–16)
IMM GRANULOCYTES # BLD AUTO: 0.1 K/UL (ref 0–0.04)
IMM GRANULOCYTES NFR BLD AUTO: 0 % (ref 0–0.5)
LYMPHOCYTES # BLD: 0.8 K/UL (ref 0.8–3.5)
LYMPHOCYTES NFR BLD: 7 % (ref 12–49)
MAGNESIUM SERPL-MCNC: 1.9 MG/DL (ref 1.6–2.4)
MCH RBC QN AUTO: 32.1 PG (ref 26–34)
MCHC RBC AUTO-ENTMCNC: 32 G/DL (ref 30–36.5)
MCV RBC AUTO: 100.3 FL (ref 80–99)
MONOCYTES # BLD: 0.9 K/UL (ref 0–1)
MONOCYTES NFR BLD: 7 % (ref 5–13)
NEUTS SEG # BLD: 10 K/UL (ref 1.8–8)
NEUTS SEG NFR BLD: 83 % (ref 32–75)
NRBC # BLD: 0 K/UL (ref 0–0.01)
NRBC BLD-RTO: 0 PER 100 WBC
PLATELET # BLD AUTO: 253 K/UL (ref 150–400)
PMV BLD AUTO: 10.1 FL (ref 8.9–12.9)
POTASSIUM SERPL-SCNC: 3.9 MMOL/L (ref 3.5–5.1)
PROT SERPL-MCNC: 6.6 G/DL (ref 6.4–8.2)
RBC # BLD AUTO: 3.83 M/UL (ref 3.8–5.2)
SODIUM SERPL-SCNC: 140 MMOL/L (ref 136–145)
WBC # BLD AUTO: 12 K/UL (ref 3.6–11)

## 2024-08-07 PROCEDURE — 80076 HEPATIC FUNCTION PANEL: CPT

## 2024-08-07 PROCEDURE — 2500000003 HC RX 250 WO HCPCS: Performed by: INTERNAL MEDICINE

## 2024-08-07 PROCEDURE — 6370000000 HC RX 637 (ALT 250 FOR IP): Performed by: INTERNAL MEDICINE

## 2024-08-07 PROCEDURE — 83735 ASSAY OF MAGNESIUM: CPT

## 2024-08-07 PROCEDURE — 6360000002 HC RX W HCPCS: Performed by: INTERNAL MEDICINE

## 2024-08-07 PROCEDURE — 36415 COLL VENOUS BLD VENIPUNCTURE: CPT

## 2024-08-07 PROCEDURE — 2580000003 HC RX 258: Performed by: INTERNAL MEDICINE

## 2024-08-07 PROCEDURE — 80048 BASIC METABOLIC PNL TOTAL CA: CPT

## 2024-08-07 PROCEDURE — 85025 COMPLETE CBC W/AUTO DIFF WBC: CPT

## 2024-08-07 RX ORDER — LIDOCAINE 50 MG/G
1 PATCH TOPICAL DAILY
Qty: 10 PATCH | Refills: 0 | Status: SHIPPED | OUTPATIENT
Start: 2024-08-07 | End: 2024-08-17

## 2024-08-07 RX ADMIN — CARBIDOPA AND LEVODOPA 2 TABLET: 25; 100 TABLET ORAL at 08:43

## 2024-08-07 RX ADMIN — SODIUM CHLORIDE, PRESERVATIVE FREE 10 ML: 5 INJECTION INTRAVENOUS at 08:41

## 2024-08-07 RX ADMIN — ENOXAPARIN SODIUM 40 MG: 100 INJECTION SUBCUTANEOUS at 08:42

## 2024-08-07 RX ADMIN — ATENOLOL 25 MG: 25 TABLET ORAL at 08:43

## 2024-08-07 RX ADMIN — HYDROMORPHONE HYDROCHLORIDE 0.5 MG: 1 INJECTION, SOLUTION INTRAMUSCULAR; INTRAVENOUS; SUBCUTANEOUS at 08:40

## 2024-08-07 ASSESSMENT — PAIN DESCRIPTION - LOCATION: LOCATION: ABDOMEN

## 2024-08-07 ASSESSMENT — PAIN SCALES - GENERAL
PAINLEVEL_OUTOF10: 5
PAINLEVEL_OUTOF10: 8

## 2024-08-07 NOTE — PLAN OF CARE
Problem: Discharge Planning  Goal: Discharge to home or other facility with appropriate resources  8/7/2024 1357 by Hayley Westbrook RN  Outcome: Adequate for Discharge  8/7/2024 1254 by Hayley Westbrook RN  Outcome: Progressing  8/7/2024 0011 by Lee Chung LPN  Outcome: Progressing     Problem: Skin/Tissue Integrity  Goal: Absence of new skin breakdown  Description: 1.  Monitor for areas of redness and/or skin breakdown  2.  Assess vascular access sites hourly  3.  Every 4-6 hours minimum:  Change oxygen saturation probe site  4.  Every 4-6 hours:  If on nasal continuous positive airway pressure, respiratory therapy assess nares and determine need for appliance change or resting period.  8/7/2024 1357 by Hayley Westbrook RN  Outcome: Adequate for Discharge  8/7/2024 1254 by Hayley Westbrook RN  Outcome: Progressing  8/7/2024 0011 by Lee Chung LPN  Outcome: Progressing     Problem: ABCDS Injury Assessment  Goal: Absence of physical injury  8/7/2024 1357 by Hayley Westbrook RN  Outcome: Adequate for Discharge  8/7/2024 1254 by Hayley Westbrook RN  Outcome: Progressing  8/7/2024 0011 by Lee Chung LPN  Outcome: Progressing     Problem: Neurosensory - Adult  Goal: Achieves stable or improved neurological status  8/7/2024 1357 by Hayley Westbrook RN  Outcome: Adequate for Discharge  8/7/2024 1254 by Hayley Westbrook RN  Outcome: Progressing     Problem: Cardiovascular - Adult  Goal: Maintains optimal cardiac output and hemodynamic stability  8/7/2024 1357 by Hayley Westbrook RN  Outcome: Adequate for Discharge  8/7/2024 1254 by Hayley Westbrook RN  Outcome: Progressing  Goal: Absence of cardiac dysrhythmias or at baseline  8/7/2024 1357 by Hayley Westbrook RN  Outcome: Adequate for Discharge  8/7/2024 1254 by Hayley Westbrook RN  Outcome: Progressing     Problem: Skin/Tissue Integrity - Adult  Goal: Incisions, wounds, or drain sites healing without S/S of infection  8/7/2024 1357 by Hayley Westbrook

## 2024-08-07 NOTE — PROGRESS NOTES
Hospitalist Progress Note               Daily Progress Note: 8/5/2024      Hospital Day: 2     Chief complaint:   Chief Complaint   Patient presents with    Peg Tube Clogged        Subjective:   Hospital course to date:    Ciarra Rutherford is a 89 y.o. female with pmh of Parkinson's disease, hypertension, atrial fibrillation who presents with malfunction of G-tube from nursing home.  Report from ER providers that family believes this was placed approximately 4 weeks ago.  ER provider attempted to unclog without success.  There is some concern of how aggressive to get since it may be a fairly new tube.  Hospital service is asked to admit and consult gastroenterology to assist with declogging.     8/5/24 no new complaints, tolerating medications well  Gastro to see        Medications reviewed  Current Facility-Administered Medications   Medication Dose Route Frequency    sodium chloride flush 0.9 % injection 5-40 mL  5-40 mL IntraVENous 2 times per day    sodium chloride flush 0.9 % injection 5-40 mL  5-40 mL IntraVENous PRN    0.9 % sodium chloride infusion   IntraVENous PRN    potassium chloride (KLOR-CON M) extended release tablet 40 mEq  40 mEq Oral PRN    Or    potassium bicarb-citric acid (EFFER-K) effervescent tablet 40 mEq  40 mEq Oral PRN    Or    potassium chloride 10 mEq/100 mL IVPB (Peripheral Line)  10 mEq IntraVENous PRN    magnesium sulfate 2000 mg in 50 mL IVPB premix  2,000 mg IntraVENous PRN    ondansetron (ZOFRAN-ODT) disintegrating tablet 4 mg  4 mg Oral Q8H PRN    Or    ondansetron (ZOFRAN) injection 4 mg  4 mg IntraVENous Q6H PRN    polyethylene glycol (GLYCOLAX) packet 17 g  17 g Oral Daily PRN    acetaminophen (TYLENOL) tablet 650 mg  650 mg Oral Q6H PRN    Or    acetaminophen (TYLENOL) suppository 650 mg  650 mg Rectal Q6H PRN    HYDROmorphone HCl PF (DILAUDID) injection 0.5 mg  0.5 mg IntraVENous Q4H PRN    0.9 % sodium chloride infusion   IntraVENous Continuous    metoprolol (LOPRESSOR) 
.echo  
4 Eyes Skin Assessment     NAME:  Ciarra Rutherford  YOB: 1934  MEDICAL RECORD NUMBER:  [id number]    The patient is being assessed for  Other weekly assessment    I agree that at least one RN has performed a thorough Head to Toe Skin Assessment on the patient. ALL assessment sites listed below have been assessed.      Areas assessed by both nurses:    Head, Face, Ears, Shoulders, Back, Chest, Arms, Elbows, Hands, Sacrum. Buttock, Coccyx, Ischium, Legs. Feet and Heels, and Under Medical Devices         Does the Patient have a Wound? No noted wound(s)       Dusty Prevention initiated by RN: Yes  Wound Care Orders initiated by RN: No    Pressure Injury (Stage 3,4, Unstageable, DTI, NWPT, and Complex wounds) if present, place Wound referral order by RN under : No    New Ostomies, if present place, Ostomy referral order under : No     Nurse 1 eSignature: Electronically signed by Lee Chung LPN on 8/7/24 at 1:45 AM EDT    **SHARE this note so that the co-signing nurse can place an eSignature**    Nurse 2 eSignature: Electronically signed by ROMELIA JENSEN RN on 8/7/24 at 2:34 AM EDT    
4 Eyes Skin Assessment     NAME:  Ciarra Rutherford  YOB: 1934  MEDICAL RECORD NUMBER:  [id number]    The patient is being assessed for  Admission    I agree that at least one RN has performed a thorough Head to Toe Skin Assessment on the patient. ALL assessment sites listed below have been assessed.      Areas assessed by both nurses:    Head, Face, Ears, Shoulders, Back, Chest, Arms, Elbows, Hands, Sacrum. Buttock, Coccyx, Ischium, Legs. Feet and Heels, and Under Medical Devices         Does the Patient have a Wound? Yes wound(s) were present on assessment. LDA wound assessment was Initiated and completed by RN    Patient has small open areas to her sacrum and bilateral buttocks. Zinc cream and foam dressing applied. Patient has boggy redness to bilateral heels as well, foam dressings placed and floated on a pillow.       Dusty Prevention initiated by RN: Yes  Wound Care Orders initiated by RN: Yes    Pressure Injury (Stage 3,4, Unstageable, DTI, NWPT, and Complex wounds) if present, place Wound referral order by RN under : Yes    New Ostomies, if present place, Ostomy referral order under : No     Nurse 1 eSignature: Electronically signed by Barbara Mon RN on 8/5/24 at 1:54 AM EDT    **SHARE this note so that the co-signing nurse can place an eSignature**    Nurse 2 eSignature: Electronically signed by Denise Lewis LPN on 8/5/24 at 2:27 AM EDT    
Discharge paperwork complete, printed and put in folder at desk. Transport to be set up. Primary nurse aware.  
PT eval order received and acknowledged. PT eval attempted at 0848 however advised to hold per RN, pt in 10/10 pain this AM, just received pain medication and is now sleeping, difficult to arouse. Will continue to follow patient and attempt PT eval at a later time. Thank you.    
PT eval order received and acknowledged. PT eval attempted at 1515 however pt's resting -128 bpm. Will defer PT at this time d/t tachycardia. Pt visibly upset, repeating \"how would you feel if someone ripped something out of you?\" Will continue to follow patient and attempt PT eval at a later time when medically appropriate. Thank you.    
RRT Clinical Rounding Nurse Progress Report      SUBJECTIVE: Patient assessed secondary to elevated Deterioration Index Score.      Vitals:    08/04/24 2334 08/04/24 2344 08/05/24 0145 08/05/24 0158   BP: (!) 153/76 (!) 147/77 137/76    Pulse: (!) 115 (!) 111 (!) 118 (!) 113   Resp: 19 26     Temp:   97.5 °F (36.4 °C)    TempSrc:   Oral    SpO2:   97%         DETERIORATION INDEX SCORE: 54    ASSESSMENT:  Spoke with RUFINO Jimenez regarding elevated deterioration index. She will assess and follow up as needed.    PLAN:  Will follow per RRT Clinical Rounding Program protocol.    Kendrick Lewis RN  [facility]: [phone]  [facility]: [phone]  
Lymphocytes % 8 (L) 12 - 49 %    Monocytes % 7 5 - 13 %    Eosinophils % 0 0 - 7 %    Basophils % 1 0 - 1 %    Immature Granulocytes % 0 0 - 0.5 %    Neutrophils Absolute 9.7 (H) 1.8 - 8.0 K/UL    Lymphocytes Absolute 0.9 0.8 - 3.5 K/UL    Monocytes Absolute 0.8 0.0 - 1.0 K/UL    Eosinophils Absolute 0.1 0.0 - 0.4 K/UL    Basophils Absolute 0.1 0.0 - 0.1 K/UL    Immature Granulocytes Absolute 0.0 0.00 - 0.04 K/UL    Differential Type AUTOMATED     Hepatic Function Panel    Collection Time: 08/06/24  5:51 AM   Result Value Ref Range    Total Protein 7.2 6.4 - 8.2 g/dL    Albumin 2.6 (L) 3.5 - 5.0 g/dL    Globulin 4.6 (H) 2.0 - 4.0 g/dL    Albumin/Globulin Ratio 0.6 (L) 1.1 - 2.2      Total Bilirubin 0.9 0.2 - 1.0 mg/dL    Bilirubin, Direct 0.3 (H) 0.0 - 0.2 mg/dL    Alk Phosphatase 78 45 - 117 U/L    AST 20 15 - 37 U/L    ALT 34 12 - 78 U/L   Magnesium    Collection Time: 08/06/24  5:51 AM   Result Value Ref Range    Magnesium 2.0 1.6 - 2.4 mg/dL        XR ABDOMEN (KUB) (SINGLE AP VIEW)   Final Result   PEG tube within the gastric lumen         Electronically signed by Rudi Márquez         [unfilled]  Assessment:     Principal Problem:    Gastrostomy tube dysfunction (HCC)  Active Problems:    Permanent atrial fibrillation (HCC)  Resolved Problems:    * No resolved hospital problems. *  S/p peg replacement   KUB confirmed placement  Plan:     Start tube feeding   Sign off   Signed By: Lebron North MD     August 6, 2024          Thank you for allowing me to participate in this patients care    DISCLAIMER:  Please note that this report was generated to utilize Dragon Dictation system, the software is designed to speed over the accuracy.  Every effort has been done to attempt to correct this mistakes upon review, but there still might be some inadvertent errors in grammar and spelling.  Please utilize caution while reading the report due to this  inherent and potential for grammatical mistakes.    This physician  
portions of the lung apices appear clear.     1.  No acute fractures. 2.  Extensive multilevel degenerative changes of the cervical spine, especially involving the lower levels. Electronically signed by Zapstitch    CT HEAD WO CONTRAST    Result Date: 7/26/2024  EXAM: CT HEAD WO CONTRAST ACC#: [id number] INDICATION: trauma COMPARISON: None. TECHNIQUE: Routine CT of the head was performed without intravenous contrast. Coronal and sagittal reconstructions were generated. CT dose reduction was achieved through use of a standardized protocol tailored for this examination and automatic exposure control for dose modulation. FINDINGS: The ventricles are normal size and configuration. There is no mass, mass effect, or acute hemorrhage. There is no CT evidence of acute ischemia. There is periventricular hypodensity compatible with chronic small vessel disease.  There is generalized atrophy. There are no acute bony abnormalities. Paranasal sinuses and mastoid air cells appear well-aerated.     No acute intracranial abnormalities. Electronically signed by Zapstitch    XR CHEST PORTABLE    Result Date: 7/26/2024  EXAM: XR CHEST PORTABLE ACC#: [id number] INDICATION: trauma COMPARISON: None. TECHNIQUE: Portable frontal view of the chest. FINDINGS: Lungs are clear. The cardiomediastinal configuration is within normal limits. No acute bony abnormalities.     No acute cardiopulmonary abnormalities. Electronically signed by Zapstitch    XR KNEE RIGHT (3 VIEWS)    Result Date: 7/26/2024  EXAM: XR KNEE RIGHT (3 VIEWS) ACC#: [id number] INDICATION: trauma. Right knee pain. COMPARISON: None. TECHNIQUE: 3 view(s) of the right knee. FINDINGS: No acute fractures, dislocations, or radiopaque foreign bodies. Chondrocalcinosis is noted.     No acute bony abnormalities. Electronically signed by Zapstitch       Discussion/MDM:     [x] High (any 2)    A. Problems (any 1)  [x] Acute/Chronic Illness/injury posing threat to life or bodily

## 2024-08-07 NOTE — DISCHARGE SUMMARY
1.8 - 8.0 K/UL    Lymphocytes Absolute 0.8 0.8 - 3.5 K/UL    Monocytes Absolute 0.9 0.0 - 1.0 K/UL    Eosinophils Absolute 0.2 0.0 - 0.4 K/UL    Basophils Absolute 0.1 0.0 - 0.1 K/UL    Immature Granulocytes Absolute 0.1 (H) 0.00 - 0.04 K/UL    Differential Type AUTOMATED     Hepatic Function Panel    Collection Time: 08/07/24  5:59 AM   Result Value Ref Range    Total Protein 6.6 6.4 - 8.2 g/dL    Albumin 2.3 (L) 3.5 - 5.0 g/dL    Globulin 4.3 (H) 2.0 - 4.0 g/dL    Albumin/Globulin Ratio 0.5 (L) 1.1 - 2.2      Total Bilirubin 1.0 0.2 - 1.0 mg/dL    Bilirubin, Direct 0.3 (H) 0.0 - 0.2 mg/dL    Alk Phosphatase 72 45 - 117 U/L    AST 16 15 - 37 U/L    ALT 10 (L) 12 - 78 U/L   Magnesium    Collection Time: 08/07/24  5:59 AM   Result Value Ref Range    Magnesium 1.9 1.6 - 2.4 mg/dL     XR ABDOMEN (KUB) (SINGLE AP VIEW)   Final Result   PEG tube within the gastric lumen         Electronically signed by Rudi Márquez        Recent Results (from the past 336 hour(s))   EKG 12 Lead    Collection Time: 08/04/24  7:33 PM   Result Value Ref Range    Ventricular Rate 122 BPM    Atrial Rate 100 BPM    QRS Duration 80 ms    Q-T Interval 334 ms    QTc Calculation (Bazett) 475 ms    R Axis -32 degrees    T Axis -74 degrees    Diagnosis       Atrial fibrillation with rapid ventricular response with premature   ventricular or aberrantly conducted complexes  Left axis deviation  Low voltage QRS  Cannot rule out Anterior infarct , age undetermined  Abnormal ECG  No previous ECGs available  Confirmed by TYLER SEGURA ([id number]) on 8/6/2024 11:30:41 AM     BMP    Collection Time: 08/04/24  8:24 PM   Result Value Ref Range    Sodium 136 136 - 145 mmol/L    Potassium 5.3 (H) 3.5 - 5.1 mmol/L    Chloride 105 97 - 108 mmol/L    CO2 26 21 - 32 mmol/L    Anion Gap 5 5 - 15 mmol/L    Glucose 113 (H) 65 - 100 mg/dL    BUN 25 (H) 6 - 20 mg/dL    Creatinine 0.68 0.55 - 1.02 mg/dL    BUN/Creatinine Ratio 37 (H) 12 - 20      Est, Glom Filt Rate 83

## 2024-08-07 NOTE — PLAN OF CARE
Problem: Discharge Planning  Goal: Discharge to home or other facility with appropriate resources  8/7/2024 1254 by Hayley Westbrook RN  Outcome: Progressing  8/7/2024 0011 by Lee Chung LPN  Outcome: Progressing     Problem: Skin/Tissue Integrity  Goal: Absence of new skin breakdown  Description: 1.  Monitor for areas of redness and/or skin breakdown  2.  Assess vascular access sites hourly  3.  Every 4-6 hours minimum:  Change oxygen saturation probe site  4.  Every 4-6 hours:  If on nasal continuous positive airway pressure, respiratory therapy assess nares and determine need for appliance change or resting period.  8/7/2024 1254 by Hayley Westbrook RN  Outcome: Progressing  8/7/2024 0011 by Lee Chung LPN  Outcome: Progressing     Problem: ABCDS Injury Assessment  Goal: Absence of physical injury  8/7/2024 1254 by Hayley Westbrook RN  Outcome: Progressing  8/7/2024 0011 by Lee Chung LPN  Outcome: Progressing     Problem: Neurosensory - Adult  Goal: Achieves stable or improved neurological status  Outcome: Progressing     Problem: Cardiovascular - Adult  Goal: Maintains optimal cardiac output and hemodynamic stability  Outcome: Progressing  Goal: Absence of cardiac dysrhythmias or at baseline  Outcome: Progressing     Problem: Skin/Tissue Integrity - Adult  Goal: Incisions, wounds, or drain sites healing without S/S of infection  Outcome: Progressing     Problem: Musculoskeletal - Adult  Goal: Return mobility to safest level of function  Outcome: Progressing     Problem: Genitourinary - Adult  Goal: Absence of urinary retention  Outcome: Progressing  Goal: Urinary catheter remains patent  Outcome: Progressing     Problem: Safety - Adult  Goal: Free from fall injury  Outcome: Progressing     Problem: Pain  Goal: Verbalizes/displays adequate comfort level or baseline comfort level  Outcome: Progressing

## 2024-08-07 NOTE — CARE COORDINATION
Transition of Care Plan:    RUR: 13%  Prior Level of Functioning: Was in SNF for short term rehab.   Disposition: SNF  If SNF or IPR: Date FOC offered: 8/5/24  Date FOC received: 8/5/24  Accepting facility: [facility]   Date authorization started with reference number: NA  Date authorization received and expires: NA  Follow up appointments: SNF to arrange  DME needed:   Transportation at discharge: [facility] assisting with Stretcher transport.   IM/IMM Medicare/ letter given: YES on 8/5/24  Is patient a  and connected with [facility]? No   If yes, was [place] transfer form completed and [facility] notified? NA  Caregiver Contact: Juan R Rutherford [phone].   Discharge Caregiver contacted prior to discharge? Yes   Care Conference needed? no  Barriers to discharge: none    Patient has been accepted to [facility] located at [address] [place] [address]. Patient will go to room 22B.  Nurse to call report to [phone]. Patient will need stretcher transport.

## 2024-08-18 ENCOUNTER — APPOINTMENT (OUTPATIENT)
Facility: HOSPITAL | Age: 89
End: 2024-08-18
Payer: MEDICARE

## 2024-08-18 ENCOUNTER — HOSPITAL ENCOUNTER (EMERGENCY)
Facility: HOSPITAL | Age: 89
Discharge: HOME | End: 2024-08-19
Attending: EMERGENCY MEDICINE
Payer: MEDICARE
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
## 2024-08-18 LAB
ALBUMIN SERPL-MCNC: 2.2 G/DL (ref 3.5–5)
ALBUMIN/GLOB SERPL: 0.4 (ref 1.1–2.2)
ALP SERPL-CCNC: 82 U/L (ref 45–117)
ALT SERPL-CCNC: 26 U/L (ref 12–78)
ANION GAP SERPL CALC-SCNC: 5 MMOL/L (ref 5–15)
AST SERPL W P-5'-P-CCNC: 20 U/L (ref 15–37)
BASOPHILS # BLD: 0 K/UL (ref 0–0.1)
BASOPHILS NFR BLD: 0 % (ref 0–1)
BILIRUB SERPL-MCNC: 0.5 MG/DL (ref 0.2–1)
BUN SERPL-MCNC: 28 MG/DL (ref 6–20)
BUN/CREAT SERPL: 36 (ref 12–20)
CA-I BLD-MCNC: 10.1 MG/DL (ref 8.5–10.1)
CHLORIDE SERPL-SCNC: 104 MMOL/L (ref 97–108)
CO2 SERPL-SCNC: 32 MMOL/L (ref 21–32)
CREAT SERPL-MCNC: 0.77 MG/DL (ref 0.55–1.02)
DIFFERENTIAL METHOD BLD: ABNORMAL
EKG ATRIAL RATE: 326 BPM
EKG DIAGNOSIS: NORMAL
EKG Q-T INTERVAL: 328 MS
EKG QRS DURATION: 80 MS
EKG QTC CALCULATION (BAZETT): 433 MS
EKG R AXIS: 66 DEGREES
EKG T AXIS: 128 DEGREES
EKG VENTRICULAR RATE: 105 BPM
EOSINOPHIL # BLD: 0.2 K/UL (ref 0–0.4)
EOSINOPHIL NFR BLD: 2 % (ref 0–7)
ERYTHROCYTE [DISTWIDTH] IN BLOOD BY AUTOMATED COUNT: 14 % (ref 11.5–14.5)
GLOBULIN SER CALC-MCNC: 5.3 G/DL (ref 2–4)
GLUCOSE SERPL-MCNC: 115 MG/DL (ref 65–100)
HCT VFR BLD AUTO: 39.3 % (ref 35–47)
HGB BLD-MCNC: 12.6 G/DL (ref 11.5–16)
IMM GRANULOCYTES # BLD AUTO: 0.1 K/UL (ref 0–0.04)
IMM GRANULOCYTES NFR BLD AUTO: 1 % (ref 0–0.5)
INR PPP: 1.1 (ref 0.9–1.1)
LYMPHOCYTES # BLD: 1.2 K/UL (ref 0.8–3.5)
LYMPHOCYTES NFR BLD: 10 % (ref 12–49)
MCH RBC QN AUTO: 31.9 PG (ref 26–34)
MCHC RBC AUTO-ENTMCNC: 32.1 G/DL (ref 30–36.5)
MCV RBC AUTO: 99.5 FL (ref 80–99)
MONOCYTES # BLD: 0.6 K/UL (ref 0–1)
MONOCYTES NFR BLD: 5 % (ref 5–13)
NEUTS SEG # BLD: 9.5 K/UL (ref 1.8–8)
NEUTS SEG NFR BLD: 82 % (ref 32–75)
NRBC # BLD: 0 K/UL (ref 0–0.01)
NRBC BLD-RTO: 0 PER 100 WBC
PLATELET # BLD AUTO: 339 K/UL (ref 150–400)
PMV BLD AUTO: 10.3 FL (ref 8.9–12.9)
POTASSIUM SERPL-SCNC: 4.1 MMOL/L (ref 3.5–5.1)
PROT SERPL-MCNC: 7.5 G/DL (ref 6.4–8.2)
PROTHROMBIN TIME: 14.1 SEC (ref 11.9–14.6)
RBC # BLD AUTO: 3.95 M/UL (ref 3.8–5.2)
SODIUM SERPL-SCNC: 141 MMOL/L (ref 136–145)
WBC # BLD AUTO: 11.6 K/UL (ref 3.6–11)

## 2024-08-18 PROCEDURE — 73502 X-RAY EXAM HIP UNI 2-3 VIEWS: CPT

## 2024-08-18 PROCEDURE — 70450 CT HEAD/BRAIN W/O DYE: CPT

## 2024-08-18 PROCEDURE — 96374 THER/PROPH/DIAG INJ IV PUSH: CPT

## 2024-08-18 PROCEDURE — 73030 X-RAY EXAM OF SHOULDER: CPT

## 2024-08-18 PROCEDURE — 93005 ELECTROCARDIOGRAM TRACING: CPT | Performed by: EMERGENCY MEDICINE

## 2024-08-18 PROCEDURE — 36415 COLL VENOUS BLD VENIPUNCTURE: CPT

## 2024-08-18 PROCEDURE — 72125 CT NECK SPINE W/O DYE: CPT

## 2024-08-18 PROCEDURE — 85025 COMPLETE CBC W/AUTO DIFF WBC: CPT

## 2024-08-18 PROCEDURE — 94761 N-INVAS EAR/PLS OXIMETRY MLT: CPT

## 2024-08-18 PROCEDURE — 80053 COMPREHEN METABOLIC PANEL: CPT

## 2024-08-18 PROCEDURE — 85610 PROTHROMBIN TIME: CPT

## 2024-08-18 PROCEDURE — 99285 EMERGENCY DEPT VISIT HI MDM: CPT

## 2024-08-18 PROCEDURE — 6360000002 HC RX W HCPCS: Performed by: EMERGENCY MEDICINE

## 2024-08-18 RX ORDER — ACETAMINOPHEN 500 MG
1000 TABLET ORAL
Status: DISCONTINUED | OUTPATIENT
Start: 2024-08-18 | End: 2024-08-19 | Stop reason: HOSPADM

## 2024-08-18 RX ORDER — MORPHINE SULFATE 2 MG/ML
2 INJECTION, SOLUTION INTRAMUSCULAR; INTRAVENOUS
Status: COMPLETED | OUTPATIENT
Start: 2024-08-18 | End: 2024-08-18

## 2024-08-18 RX ADMIN — MORPHINE SULFATE 2 MG: 2 INJECTION, SOLUTION INTRAMUSCULAR; INTRAVENOUS at 19:39

## 2024-08-18 ASSESSMENT — PAIN SCALES - GENERAL: PAINLEVEL_OUTOF10: 10

## 2024-08-18 ASSESSMENT — LIFESTYLE VARIABLES
HOW MANY STANDARD DRINKS CONTAINING ALCOHOL DO YOU HAVE ON A TYPICAL DAY: PATIENT DOES NOT DRINK
HOW OFTEN DO YOU HAVE A DRINK CONTAINING ALCOHOL: NEVER

## 2024-08-18 ASSESSMENT — PAIN - FUNCTIONAL ASSESSMENT: PAIN_FUNCTIONAL_ASSESSMENT: 0-10

## 2024-08-18 NOTE — ED PROVIDER NOTES
11.6 (H) 3.6 - 11.0 K/uL    RBC 3.95 3.80 - 5.20 M/uL    Hemoglobin 12.6 11.5 - 16.0 g/dL    Hematocrit 39.3 35.0 - 47.0 %    MCV 99.5 (H) 80.0 - 99.0 FL    MCH 31.9 26.0 - 34.0 PG    MCHC 32.1 30.0 - 36.5 g/dL    RDW 14.0 11.5 - 14.5 %    Platelets 339 150 - 400 K/uL    MPV 10.3 8.9 - 12.9 FL    Nucleated RBCs 0.0 0.0  WBC    nRBC 0.00 0.00 - 0.01 K/uL    Neutrophils % 82 (H) 32 - 75 %    Lymphocytes % 10 (L) 12 - 49 %    Monocytes % 5 5 - 13 %    Eosinophils % 2 0 - 7 %    Basophils % 0 0 - 1 %    Immature Granulocytes % 1 (H) 0 - 0.5 %    Neutrophils Absolute 9.5 (H) 1.8 - 8.0 K/UL    Lymphocytes Absolute 1.2 0.8 - 3.5 K/UL    Monocytes Absolute 0.6 0.0 - 1.0 K/UL    Eosinophils Absolute 0.2 0.0 - 0.4 K/UL    Basophils Absolute 0.0 0.0 - 0.1 K/UL    Immature Granulocytes Absolute 0.1 (H) 0.00 - 0.04 K/UL    Differential Type AUTOMATED     Comprehensive Metabolic Panel    Collection Time: 08/18/24  5:10 PM   Result Value Ref Range    Sodium 141 136 - 145 mmol/L    Potassium 4.1 3.5 - 5.1 mmol/L    Chloride 104 97 - 108 mmol/L    CO2 32 21 - 32 mmol/L    Anion Gap 5 5 - 15 mmol/L    Glucose 115 (H) 65 - 100 mg/dL    BUN 28 (H) 6 - 20 mg/dL    Creatinine 0.77 0.55 - 1.02 mg/dL    BUN/Creatinine Ratio 36 (H) 12 - 20      Est, Glom Filt Rate 73 >60 ml/min/1.73m2    Calcium 10.1 8.5 - 10.1 mg/dL    Total Bilirubin 0.5 0.2 - 1.0 mg/dL    AST 20 15 - 37 U/L    ALT 26 12 - 78 U/L    Alk Phosphatase 82 45 - 117 U/L    Total Protein 7.5 6.4 - 8.2 g/dL    Albumin 2.2 (L) 3.5 - 5.0 g/dL    Globulin 5.3 (H) 2.0 - 4.0 g/dL    Albumin/Globulin Ratio 0.4 (L) 1.1 - 2.2     Protime-INR    Collection Time: 08/18/24  5:10 PM   Result Value Ref Range    Protime 14.1 11.9 - 14.6 sec    INR 1.1 0.9 - 1.1         EKG:.Not Applicable    Radiologic Studies:  Non-plain film images such as CT, Ultrasound and MRI are read by the radiologist. Plain radiographic images are visualized and preliminarily interpreted by the ED Provider with

## 2024-08-19 VITALS
OXYGEN SATURATION: 100 % | RESPIRATION RATE: 16 BRPM | SYSTOLIC BLOOD PRESSURE: 125 MMHG | WEIGHT: 140 LBS | TEMPERATURE: 97.8 F | DIASTOLIC BLOOD PRESSURE: 64 MMHG | HEIGHT: 61 IN | HEART RATE: 81 BPM | BODY MASS INDEX: 26.43 KG/M2

## 2024-08-19 NOTE — DISCHARGE INSTRUCTIONS
Thank you for choosing our Emergency Department for your care.  It is our privilege to care for you in your time of need.  In the next several days, you may receive a survey via email or mailed to your home about your experience with our team.  We would greatly appreciate you taking a few minutes to complete the survey, as we use this information to learn what we have done well and what we could be doing better. Thank you for trusting us with your care!    Below you will find a list of your tests from today's visit.   Labs  Recent Results (from the past 12 hour(s))   EKG 12 Lead    Collection Time: 08/18/24  4:58 PM   Result Value Ref Range    Ventricular Rate 105 BPM    Atrial Rate 326 BPM    QRS Duration 80 ms    Q-T Interval 328 ms    QTc Calculation (Bazett) 433 ms    R Axis 66 degrees    T Axis 128 degrees    Diagnosis       Atrial fibrillation with rapid ventricular response with premature   ventricular or aberrantly conducted complexes  Low voltage QRS  Cannot rule out Anterior infarct (cited on or before 04-AUG-2024)  ST & T wave abnormality, consider lateral ischemia  Abnormal ECG  When compared with ECG of 04-AUG-2024 19:33,  QRS axis Shifted right    Confirmed by DIANA CORRAL MD ([id number]) on 8/18/2024 6:37:09 PM     CBC with Auto Differential    Collection Time: 08/18/24  5:10 PM   Result Value Ref Range    WBC 11.6 (H) 3.6 - 11.0 K/uL    RBC 3.95 3.80 - 5.20 M/uL    Hemoglobin 12.6 11.5 - 16.0 g/dL    Hematocrit 39.3 35.0 - 47.0 %    MCV 99.5 (H) 80.0 - 99.0 FL    MCH 31.9 26.0 - 34.0 PG    MCHC 32.1 30.0 - 36.5 g/dL    RDW 14.0 11.5 - 14.5 %    Platelets 339 150 - 400 K/uL    MPV 10.3 8.9 - 12.9 FL    Nucleated RBCs 0.0 0.0  WBC    nRBC 0.00 0.00 - 0.01 K/uL    Neutrophils % 82 (H) 32 - 75 %    Lymphocytes % 10 (L) 12 - 49 %    Monocytes % 5 5 - 13 %    Eosinophils % 2 0 - 7 %    Basophils % 0 0 - 1 %    Immature Granulocytes % 1 (H) 0 - 0.5 %    Neutrophils Absolute 9.5 (H) 1.8 - 8.0 K/UL

## 2024-09-26 ENCOUNTER — HOSPITAL ENCOUNTER (INPATIENT)
Facility: HOSPITAL | Age: 89
LOS: 2 days | Discharge: HOME | End: 2024-09-28
Attending: EMERGENCY MEDICINE | Admitting: HOSPITALIST
Payer: MEDICARE

## 2024-09-26 DIAGNOSIS — T85.528A DISLODGED GASTROSTOMY TUBE: Primary | ICD-10-CM

## 2024-09-26 PROBLEM — K94.23 PEG TUBE MALFUNCTION (HCC): Status: ACTIVE | Noted: 2024-09-26

## 2024-09-26 LAB
ANION GAP SERPL CALC-SCNC: 9 MMOL/L (ref 2–12)
BASOPHILS # BLD: 0.1 K/UL (ref 0–0.1)
BASOPHILS NFR BLD: 1 % (ref 0–1)
BUN SERPL-MCNC: 26 MG/DL (ref 6–20)
BUN/CREAT SERPL: 38 (ref 12–20)
CA-I BLD-MCNC: 10 MG/DL (ref 8.5–10.1)
CHLORIDE SERPL-SCNC: 106 MMOL/L (ref 97–108)
CO2 SERPL-SCNC: 26 MMOL/L (ref 21–32)
CREAT SERPL-MCNC: 0.69 MG/DL (ref 0.55–1.02)
DIFFERENTIAL METHOD BLD: ABNORMAL
EOSINOPHIL # BLD: 0.2 K/UL (ref 0–0.4)
EOSINOPHIL NFR BLD: 2 % (ref 0–7)
ERYTHROCYTE [DISTWIDTH] IN BLOOD BY AUTOMATED COUNT: 15.8 % (ref 11.5–14.5)
GLUCOSE SERPL-MCNC: 103 MG/DL (ref 65–100)
HCT VFR BLD AUTO: 39.2 % (ref 35–47)
HGB BLD-MCNC: 12.5 G/DL (ref 11.5–16)
IMM GRANULOCYTES # BLD AUTO: 0 K/UL (ref 0–0.04)
IMM GRANULOCYTES NFR BLD AUTO: 0 % (ref 0–0.5)
LYMPHOCYTES # BLD: 1.6 K/UL (ref 0.8–3.5)
LYMPHOCYTES NFR BLD: 18 % (ref 12–49)
MCH RBC QN AUTO: 31.9 PG (ref 26–34)
MCHC RBC AUTO-ENTMCNC: 31.9 G/DL (ref 30–36.5)
MCV RBC AUTO: 100 FL (ref 80–99)
MONOCYTES # BLD: 0.8 K/UL (ref 0–1)
MONOCYTES NFR BLD: 9 % (ref 5–13)
MRSA DNA SPEC QL NAA+PROBE: DETECTED
NEUTS SEG # BLD: 6.3 K/UL (ref 1.8–8)
NEUTS SEG NFR BLD: 70 % (ref 32–75)
NRBC # BLD: 0 K/UL (ref 0–0.01)
NRBC BLD-RTO: 0 PER 100 WBC
PLATELET # BLD AUTO: 305 K/UL (ref 150–400)
PMV BLD AUTO: 10.2 FL (ref 8.9–12.9)
POTASSIUM SERPL-SCNC: 4 MMOL/L (ref 3.5–5.1)
RBC # BLD AUTO: 3.92 M/UL (ref 3.8–5.2)
SODIUM SERPL-SCNC: 141 MMOL/L (ref 136–145)
WBC # BLD AUTO: 9 K/UL (ref 3.6–11)

## 2024-09-26 PROCEDURE — 85025 COMPLETE CBC W/AUTO DIFF WBC: CPT

## 2024-09-26 PROCEDURE — 87641 MR-STAPH DNA AMP PROBE: CPT

## 2024-09-26 PROCEDURE — 80048 BASIC METABOLIC PNL TOTAL CA: CPT

## 2024-09-26 PROCEDURE — 1100000000 HC RM PRIVATE

## 2024-09-26 PROCEDURE — 2580000003 HC RX 258: Performed by: STUDENT IN AN ORGANIZED HEALTH CARE EDUCATION/TRAINING PROGRAM

## 2024-09-26 PROCEDURE — 99285 EMERGENCY DEPT VISIT HI MDM: CPT

## 2024-09-26 RX ORDER — ONDANSETRON 2 MG/ML
4 INJECTION INTRAMUSCULAR; INTRAVENOUS EVERY 6 HOURS PRN
Status: DISCONTINUED | OUTPATIENT
Start: 2024-09-26 | End: 2024-09-28 | Stop reason: HOSPADM

## 2024-09-26 RX ORDER — ACETAMINOPHEN 325 MG/1
650 TABLET ORAL EVERY 6 HOURS PRN
COMMUNITY

## 2024-09-26 RX ORDER — ENOXAPARIN SODIUM 100 MG/ML
40 INJECTION SUBCUTANEOUS DAILY
Status: CANCELLED | OUTPATIENT
Start: 2024-09-26

## 2024-09-26 RX ORDER — SODIUM CHLORIDE 0.9 % (FLUSH) 0.9 %
5-40 SYRINGE (ML) INJECTION EVERY 12 HOURS SCHEDULED
Status: DISCONTINUED | OUTPATIENT
Start: 2024-09-26 | End: 2024-09-28 | Stop reason: HOSPADM

## 2024-09-26 RX ORDER — ACETAMINOPHEN 325 MG/1
650 TABLET ORAL EVERY 6 HOURS PRN
Status: DISCONTINUED | OUTPATIENT
Start: 2024-09-26 | End: 2024-09-28 | Stop reason: HOSPADM

## 2024-09-26 RX ORDER — BISACODYL 10 MG
10 SUPPOSITORY, RECTAL RECTAL DAILY
COMMUNITY

## 2024-09-26 RX ORDER — SODIUM CHLORIDE 9 MG/ML
INJECTION, SOLUTION INTRAVENOUS PRN
Status: DISCONTINUED | OUTPATIENT
Start: 2024-09-26 | End: 2024-09-28 | Stop reason: HOSPADM

## 2024-09-26 RX ORDER — MAGNESIUM SULFATE IN WATER 40 MG/ML
2000 INJECTION, SOLUTION INTRAVENOUS PRN
Status: DISCONTINUED | OUTPATIENT
Start: 2024-09-26 | End: 2024-09-28 | Stop reason: HOSPADM

## 2024-09-26 RX ORDER — POTASSIUM CHLORIDE 1500 MG/1
40 TABLET, EXTENDED RELEASE ORAL PRN
Status: DISCONTINUED | OUTPATIENT
Start: 2024-09-26 | End: 2024-09-28 | Stop reason: HOSPADM

## 2024-09-26 RX ORDER — ONDANSETRON 4 MG/1
4 TABLET, ORALLY DISINTEGRATING ORAL EVERY 8 HOURS PRN
Status: DISCONTINUED | OUTPATIENT
Start: 2024-09-26 | End: 2024-09-28 | Stop reason: HOSPADM

## 2024-09-26 RX ORDER — POTASSIUM CHLORIDE 7.45 MG/ML
10 INJECTION INTRAVENOUS PRN
Status: DISCONTINUED | OUTPATIENT
Start: 2024-09-26 | End: 2024-09-28 | Stop reason: HOSPADM

## 2024-09-26 RX ORDER — LIDOCAINE 50 MG/G
1 PATCH TOPICAL DAILY
COMMUNITY

## 2024-09-26 RX ORDER — POLYETHYLENE GLYCOL 3350 17 G/17G
17 POWDER, FOR SOLUTION ORAL DAILY PRN
Status: DISCONTINUED | OUTPATIENT
Start: 2024-09-26 | End: 2024-09-28 | Stop reason: HOSPADM

## 2024-09-26 RX ORDER — ACETAMINOPHEN 650 MG/1
650 SUPPOSITORY RECTAL EVERY 6 HOURS PRN
Status: DISCONTINUED | OUTPATIENT
Start: 2024-09-26 | End: 2024-09-28 | Stop reason: HOSPADM

## 2024-09-26 RX ORDER — SODIUM CHLORIDE 0.9 % (FLUSH) 0.9 %
5-40 SYRINGE (ML) INJECTION PRN
Status: DISCONTINUED | OUTPATIENT
Start: 2024-09-26 | End: 2024-09-28 | Stop reason: HOSPADM

## 2024-09-26 RX ORDER — LANOLIN ALCOHOL/MO/W.PET/CERES
3 CREAM (GRAM) TOPICAL DAILY
COMMUNITY

## 2024-09-26 RX ADMIN — SODIUM CHLORIDE, PRESERVATIVE FREE 10 ML: 5 INJECTION INTRAVENOUS at 19:51

## 2024-09-26 ASSESSMENT — PAIN SCALES - GENERAL: PAINLEVEL_OUTOF10: 0

## 2024-09-26 ASSESSMENT — PAIN - FUNCTIONAL ASSESSMENT: PAIN_FUNCTIONAL_ASSESSMENT: 0-10

## 2024-09-26 ASSESSMENT — PAIN DESCRIPTION - LOCATION: LOCATION: NECK

## 2024-09-26 NOTE — H&P
History & Physical    Primary Care Provider: None, None  Source of Information: Patient/family     Chief complaint:   Chief Complaint   Patient presents with    G Tube Complications        History of Presenting Illness:   Ciarra Rutherford is a 89 y.o. female with multiple medical problems including hyper, Parkinson's disease, atrial fibrillation, failure to thrive presents with displaced PEG tube.  Patient comes from [facility] and rehab.  Patient is a poor historian.    In ED, afebrile and hemodynamically stable.  They did report A-fib with RVR on telemetry which revealed a heart rate in 110s- 130s.    GI was consulted for PEG tube pulled out.       Review of Systems:  Pertinent items are noted in the History of Present Illness.     Past Medical History:   Diagnosis Date    Atrial fibrillation (HCC)     Cervicalgia     Dementia (HCC)     Failure to thrive in adult     Hypertension     Parkinson disease (HCC)     Pressure ulcer, buttock         No past surgical history on file.    Prior to Admission medications    Medication Sig Start Date End Date Taking? Authorizing Provider   amitriptyline (ELAVIL) 25 MG tablet 1 tablet by PEG Tube route nightly   Yes José Miguel Ramsey MD   bisacodyl (DULCOLAX) 10 MG suppository Place 1 suppository rectally daily   Yes José Miguel Ramsey MD   lidocaine (LIDODERM) 5 % Place 1 patch onto the skin daily 12 hours on, 12 hours off.   Yes José Miguel Ramsey MD   melatonin 3 MG TABS tablet 1 tablet by PEG Tube route daily   Yes José Miguel Ramsey MD   acetaminophen (TYLENOL) 325 MG tablet 2 tablets by PEG Tube route every 6 hours as needed for Pain   Yes José Miguel Ramsey MD   sertraline (ZOLOFT) 50 MG tablet 1 tablet by PEG Tube route daily   Yes José Miguel Ramsey MD   dilTIAZem (CARDIZEM CD) 180 MG extended release capsule Take 1 capsule by mouth daily 7/8/24  Yes José Miguel Ramsey MD   atenolol (TENORMIN) 25 MG tablet Take 1 tablet by mouth daily  medications    Hypertension  -Home medications are : clonidine 0.5 Mg every 6 hours as needed for hypertension    Anxiety disorder  - Sertraline    Parkinson's disease  - Continue home carbidopa levodopa (Sinemet)        CODE STATUS:  Prior     Social determinants of health: None known      Home medications were reviewed    Signed By: Ac Seo MD     September 26, 2024

## 2024-09-26 NOTE — ED NOTES
ED TO INPATIENT SBAR HANDOFF    Patient Name: Ciarra Rutherford   Preferred Name: Ciarra  : 1934  89 y.o.   Family/Caregiver Present: no   Code Status Order: Prior  PO Status: No orders entered at this time  Telemetry Order: Yes  C-SSRS: Risk of Suicide: No Risk  Sitter no   Restraints:   no  Sepsis Risk Score      Situation  Chief Complaint   Patient presents with    G Tube Complications     Brief Description of Patient's Condition: Pt arrives from [facility] and rehab, PEG tube came out.   Mental Status: alert  Arrived from:Assisted Living  Imaging:   No orders to display     Abnormal labs:   Abnormal Labs Reviewed   CBC WITH AUTO DIFFERENTIAL - Abnormal; Notable for the following components:       Result Value    .0 (*)     RDW 15.8 (*)     All other components within normal limits   BASIC METABOLIC PANEL - Abnormal; Notable for the following components:    Glucose 103 (*)     BUN 26 (*)     BUN/Creatinine Ratio 38 (*)     All other components within normal limits       Background  Allergies:   Allergies   Allergen Reactions    Iodine Swelling     History:   Past Medical History:   Diagnosis Date    Atrial fibrillation (HCC)     Cervicalgia     Dementia (HCC)     Failure to thrive in adult     Hypertension     Parkinson disease (HCC)     Pressure ulcer, buttock        Assessment  Vitals: MEWS Score: 1  Level of Consciousness: Alert (0)   Vitals:    24 1600 24 1615 24 1630 24 1645   BP:   (!) 134/101 132/66   Pulse: (!) 112 (!) 113 (!) 119 (!) 108   Resp: 21 17 16 12   Temp:       TempSrc:       SpO2: 92% 96% 95% 96%   Weight:       Height:         Deterioration Index (DI): Deterioration Index: 22.52  Deterioration Index (DI) Interventions Performed:    O2 Flow Rate:    O2 Device: O2 Device: None (Room air)  Cardiac Rhythm:    Critical Lab Results: [unfilled]  Cultures: Cultures:None  NIH Score: NIH     Active LDA's:   Peripheral IV 24 Distal;Posterior;Right Forearm

## 2024-09-26 NOTE — ED PROVIDER NOTES
[facility] EMERGENCY DEPT  EMERGENCY DEPARTMENT HISTORY AND PHYSICAL EXAM      Date: 9/26/2024  Patient Name: Ciarra Rutherford  MRN: [id number]  YOB: 1934  Date of evaluation: 9/26/2024  Provider: Swetha Colmenares MD   Note Started: 11:31 AM EDT 9/26/24    HISTORY OF PRESENT ILLNESS     Chief Complaint   Patient presents with    G Tube Complications       History Provided By: Patient    HPI: Ciarra Rutherford is a 89 y.o. female here from [facility] re[facility] for dislodged PEG tube.  Unclear when it happened or the usual size of her PEG tube.    PAST MEDICAL HISTORY   Past Medical History:  Past Medical History:   Diagnosis Date    Atrial fibrillation (HCC)     Cervicalgia     Dementia (HCC)     Failure to thrive in adult     Hypertension     Parkinson disease (HCC)     Pressure ulcer, buttock        Past Surgical History:  No past surgical history on file.    Family History:  No family history on file.    Social History:  Social History     Tobacco Use    Smoking status: Former     Types: Cigarettes    Smokeless tobacco: Never       Allergies:  Allergies   Allergen Reactions    Iodine Swelling       PCP: None, None    Current Meds:   No current facility-administered medications for this encounter.     Current Outpatient Medications   Medication Sig Dispense Refill    dilTIAZem (CARDIZEM CD) 180 MG extended release capsule Take 1 capsule by mouth daily      atenolol (TENORMIN) 25 MG tablet Take 1 tablet by mouth daily      cloNIDine (CATAPRES) 0.1 MG tablet Take 1 tablet by mouth every 6 hours as needed for High Blood Pressure      enoxaparin (LOVENOX) 300 MG/3ML injection Inject 0.4 mLs into the skin daily      ondansetron (ZOFRAN-ODT) 4 MG disintegrating tablet Take 1 tablet by mouth every 6 hours as needed for Nausea or Vomiting      latanoprost (XALATAN) 0.005 % ophthalmic solution Place 1 drop into both eyes nightly      carbidopa-levodopa (SINEMET)  MG per tablet 2 tablets by PEG Tube route 3

## 2024-09-26 NOTE — PROGRESS NOTES
Pt HR noted 117-130 Afib, pt does has hx afib.Provider James notified,order received for continuous telemetry monitoring. Bed coordinator Marisel notified.

## 2024-09-27 ENCOUNTER — ANESTHESIA (OUTPATIENT)
Facility: HOSPITAL | Age: 89
End: 2024-09-27
Payer: MEDICARE

## 2024-09-27 ENCOUNTER — ANESTHESIA EVENT (OUTPATIENT)
Facility: HOSPITAL | Age: 89
End: 2024-09-27
Payer: MEDICARE

## 2024-09-27 LAB
ANION GAP SERPL CALC-SCNC: 9 MMOL/L (ref 2–12)
APPEARANCE UR: CLEAR
BACTERIA URNS QL MICRO: NEGATIVE /HPF
BASOPHILS # BLD: 0.1 K/UL (ref 0–0.1)
BASOPHILS NFR BLD: 1 % (ref 0–1)
BILIRUB UR QL: NEGATIVE
BUN SERPL-MCNC: 27 MG/DL (ref 6–20)
BUN/CREAT SERPL: 42 (ref 12–20)
CA-I BLD-MCNC: 9.5 MG/DL (ref 8.5–10.1)
CAOX CRY URNS QL MICRO: ABNORMAL
CHLORIDE SERPL-SCNC: 110 MMOL/L (ref 97–108)
CO2 SERPL-SCNC: 23 MMOL/L (ref 21–32)
COLOR UR: YELLOW
CREAT SERPL-MCNC: 0.64 MG/DL (ref 0.55–1.02)
DIFFERENTIAL METHOD BLD: ABNORMAL
EOSINOPHIL # BLD: 0.2 K/UL (ref 0–0.4)
EOSINOPHIL NFR BLD: 4 % (ref 0–7)
ERYTHROCYTE [DISTWIDTH] IN BLOOD BY AUTOMATED COUNT: 15.9 % (ref 11.5–14.5)
GLUCOSE SERPL-MCNC: 82 MG/DL (ref 65–100)
GLUCOSE UR STRIP.AUTO-MCNC: NEGATIVE MG/DL
HCT VFR BLD AUTO: 38.3 % (ref 35–47)
HGB BLD-MCNC: 11.6 G/DL (ref 11.5–16)
HGB UR QL STRIP: NEGATIVE
IMM GRANULOCYTES # BLD AUTO: 0 K/UL (ref 0–0.04)
IMM GRANULOCYTES NFR BLD AUTO: 0 % (ref 0–0.5)
KETONES UR QL STRIP.AUTO: NEGATIVE MG/DL
LEUKOCYTE ESTERASE UR QL STRIP.AUTO: NEGATIVE
LYMPHOCYTES # BLD: 1.3 K/UL (ref 0.8–3.5)
LYMPHOCYTES NFR BLD: 22 % (ref 12–49)
MCH RBC QN AUTO: 31.9 PG (ref 26–34)
MCHC RBC AUTO-ENTMCNC: 30.3 G/DL (ref 30–36.5)
MCV RBC AUTO: 105.2 FL (ref 80–99)
MONOCYTES # BLD: 0.7 K/UL (ref 0–1)
MONOCYTES NFR BLD: 11 % (ref 5–13)
NEUTS SEG # BLD: 3.7 K/UL (ref 1.8–8)
NEUTS SEG NFR BLD: 62 % (ref 32–75)
NITRITE UR QL STRIP.AUTO: NEGATIVE
NRBC # BLD: 0 K/UL (ref 0–0.01)
NRBC BLD-RTO: 0 PER 100 WBC
PH UR STRIP: 6 (ref 5–8)
PLATELET # BLD AUTO: 151 K/UL (ref 150–400)
POTASSIUM SERPL-SCNC: 3.8 MMOL/L (ref 3.5–5.1)
PROT UR STRIP-MCNC: 30 MG/DL
RBC # BLD AUTO: 3.64 M/UL (ref 3.8–5.2)
RBC #/AREA URNS HPF: ABNORMAL /HPF (ref 0–5)
SODIUM SERPL-SCNC: 142 MMOL/L (ref 136–145)
SP GR UR REFRACTOMETRY: 1.02 (ref 1–1.03)
UROBILINOGEN UR QL STRIP.AUTO: 0.1 EU/DL (ref 0.1–1)
WBC # BLD AUTO: 6 K/UL (ref 3.6–11)
WBC URNS QL MICRO: ABNORMAL /HPF (ref 0–4)

## 2024-09-27 PROCEDURE — 80048 BASIC METABOLIC PNL TOTAL CA: CPT

## 2024-09-27 PROCEDURE — 85025 COMPLETE CBC W/AUTO DIFF WBC: CPT

## 2024-09-27 PROCEDURE — 3700000000 HC ANESTHESIA ATTENDED CARE: Performed by: INTERNAL MEDICINE

## 2024-09-27 PROCEDURE — 7100000011 HC PHASE II RECOVERY - ADDTL 15 MIN: Performed by: INTERNAL MEDICINE

## 2024-09-27 PROCEDURE — 81001 URINALYSIS AUTO W/SCOPE: CPT

## 2024-09-27 PROCEDURE — 3600007503: Performed by: INTERNAL MEDICINE

## 2024-09-27 PROCEDURE — 1100000000 HC RM PRIVATE

## 2024-09-27 PROCEDURE — 7100000010 HC PHASE II RECOVERY - FIRST 15 MIN: Performed by: INTERNAL MEDICINE

## 2024-09-27 PROCEDURE — 2580000003 HC RX 258: Performed by: STUDENT IN AN ORGANIZED HEALTH CARE EDUCATION/TRAINING PROGRAM

## 2024-09-27 PROCEDURE — 0DH68UZ INSERTION OF FEEDING DEVICE INTO STOMACH, VIA NATURAL OR ARTIFICIAL OPENING ENDOSCOPIC: ICD-10-PCS | Performed by: INTERNAL MEDICINE

## 2024-09-27 PROCEDURE — 2709999900 HC NON-CHARGEABLE SUPPLY: Performed by: INTERNAL MEDICINE

## 2024-09-27 PROCEDURE — 2500000003 HC RX 250 WO HCPCS: Performed by: NURSE ANESTHETIST, CERTIFIED REGISTERED

## 2024-09-27 PROCEDURE — 2580000003 HC RX 258: Performed by: NURSE ANESTHETIST, CERTIFIED REGISTERED

## 2024-09-27 PROCEDURE — 6360000002 HC RX W HCPCS: Performed by: NURSE ANESTHETIST, CERTIFIED REGISTERED

## 2024-09-27 PROCEDURE — 51798 US URINE CAPACITY MEASURE: CPT

## 2024-09-27 PROCEDURE — 3700000001 HC ADD 15 MINUTES (ANESTHESIA): Performed by: INTERNAL MEDICINE

## 2024-09-27 PROCEDURE — 3600007513: Performed by: INTERNAL MEDICINE

## 2024-09-27 RX ORDER — SODIUM CHLORIDE, SODIUM LACTATE, POTASSIUM CHLORIDE, CALCIUM CHLORIDE 600; 310; 30; 20 MG/100ML; MG/100ML; MG/100ML; MG/100ML
INJECTION, SOLUTION INTRAVENOUS
Status: DISCONTINUED | OUTPATIENT
Start: 2024-09-27 | End: 2024-09-27 | Stop reason: SDUPTHER

## 2024-09-27 RX ORDER — GLYCOPYRROLATE 0.2 MG/ML
INJECTION INTRAMUSCULAR; INTRAVENOUS
Status: COMPLETED
Start: 2024-09-27 | End: 2024-09-27

## 2024-09-27 RX ORDER — GLYCOPYRROLATE 0.2 MG/ML
INJECTION INTRAMUSCULAR; INTRAVENOUS
Status: DISCONTINUED | OUTPATIENT
Start: 2024-09-27 | End: 2024-09-27 | Stop reason: SDUPTHER

## 2024-09-27 RX ORDER — PROPOFOL 10 MG/ML
INJECTION, EMULSION INTRAVENOUS
Status: COMPLETED
Start: 2024-09-27 | End: 2024-09-27

## 2024-09-27 RX ORDER — LIDOCAINE HYDROCHLORIDE 20 MG/ML
INJECTION, SOLUTION INTRAVENOUS
Status: DISPENSED
Start: 2024-09-27 | End: 2024-09-27

## 2024-09-27 RX ORDER — LIDOCAINE HYDROCHLORIDE 20 MG/ML
INJECTION, SOLUTION EPIDURAL; INFILTRATION; INTRACAUDAL; PERINEURAL
Status: DISCONTINUED | OUTPATIENT
Start: 2024-09-27 | End: 2024-09-27 | Stop reason: SDUPTHER

## 2024-09-27 RX ADMIN — LIDOCAINE HYDROCHLORIDE 50 MG: 20 SOLUTION INTRAVENOUS at 09:31

## 2024-09-27 RX ADMIN — SODIUM CHLORIDE, POTASSIUM CHLORIDE, SODIUM LACTATE AND CALCIUM CHLORIDE: 600; 310; 30; 20 INJECTION, SOLUTION INTRAVENOUS at 09:27

## 2024-09-27 RX ADMIN — SODIUM CHLORIDE, PRESERVATIVE FREE 10 ML: 5 INJECTION INTRAVENOUS at 20:09

## 2024-09-27 RX ADMIN — PROPOFOL 25 MG: 10 INJECTION, EMULSION INTRAVENOUS at 09:35

## 2024-09-27 RX ADMIN — PROPOFOL 50 MG: 10 INJECTION, EMULSION INTRAVENOUS at 09:31

## 2024-09-27 RX ADMIN — GLYCOPYRROLATE 0.2 MG: 0.2 INJECTION, SOLUTION INTRAMUSCULAR; INTRAVENOUS at 09:40

## 2024-09-27 RX ADMIN — PROPOFOL 25 MG: 10 INJECTION, EMULSION INTRAVENOUS at 09:40

## 2024-09-27 RX ADMIN — SODIUM CHLORIDE, PRESERVATIVE FREE 10 ML: 5 INJECTION INTRAVENOUS at 09:00

## 2024-09-27 ASSESSMENT — PAIN SCALES - GENERAL
PAINLEVEL_OUTOF10: 0
PAINLEVEL_OUTOF10: 0

## 2024-09-27 NOTE — CARE COORDINATION
09/27/24 7030   Service Assessment   Patient Orientation Unable to Assess   Cognition Alert   History Provided By Child/Family   Primary Caregiver Other (Comment)  ([facility])   Accompanied By/Relationship alone   Support Systems Family Members;Spouse/Significant Other   Patient's Healthcare Decision Maker is: Named in Scanned ACP Document   PCP Verified by CM No  (seen at facility)   Prior Functional Level Assistance with the following:;Bathing;Dressing;Toileting;Feeding;Cooking;Shopping;Housework;Mobility   Current Functional Level Assistance with the following:;Dressing;Bathing;Toileting;Feeding;Cooking;Housework;Shopping;Mobility   Can patient return to prior living arrangement Yes   Ability to make needs known: Poor   Family able to assist with home care needs: No   Would you like for me to discuss the discharge plan with any other family members/significant others, and if so, who? Yes   Financial Resources Medicare   Community Resources None     Call amde and spoke with daughter. Spouse is in hospital currently. Per daughter, has been in [facility] for last 2-3 months, plans to return, referral sent. She does have concerns about mentation. Perfect served attending to speak with family.    Current discharge plan is [facility].

## 2024-09-27 NOTE — PROGRESS NOTES
34.0 PG    MCHC 31.9 30.0 - 36.5 g/dL    RDW 15.8 (H) 11.5 - 14.5 %    Platelets 305 150 - 400 K/uL    MPV 10.2 8.9 - 12.9 FL    Nucleated RBCs 0.0 0.0  WBC    nRBC 0.00 0.00 - 0.01 K/uL    Neutrophils % 70 32 - 75 %    Lymphocytes % 18 12 - 49 %    Monocytes % 9 5 - 13 %    Eosinophils % 2 0 - 7 %    Basophils % 1 0 - 1 %    Immature Granulocytes % 0 0 - 0.5 %    Neutrophils Absolute 6.3 1.8 - 8.0 K/UL    Lymphocytes Absolute 1.6 0.8 - 3.5 K/UL    Monocytes Absolute 0.8 0.0 - 1.0 K/UL    Eosinophils Absolute 0.2 0.0 - 0.4 K/UL    Basophils Absolute 0.1 0.0 - 0.1 K/UL    Immature Granulocytes Absolute 0.0 0.00 - 0.04 K/UL    Differential Type AUTOMATED     BMP    Collection Time: 09/26/24  4:07 PM   Result Value Ref Range    Sodium 141 136 - 145 mmol/L    Potassium 4.0 3.5 - 5.1 mmol/L    Chloride 106 97 - 108 mmol/L    CO2 26 21 - 32 mmol/L    Anion Gap 9 2 - 12 mmol/L    Glucose 103 (H) 65 - 100 mg/dL    BUN 26 (H) 6 - 20 mg/dL    Creatinine 0.69 0.55 - 1.02 mg/dL    BUN/Creatinine Ratio 38 (H) 12 - 20      Est, Glom Filt Rate 82 >60 ml/min/1.73m2    Calcium 10.0 8.5 - 10.1 mg/dL   MRSA by PCR    Collection Time: 09/26/24  7:15 PM    Specimen: Swab   Result Value Ref Range    MRSA by PCR DETECTED (A) Not Detected     Urinalysis with Microscopic    Collection Time: 09/27/24  4:30 AM   Result Value Ref Range    Color, UA Yellow      Appearance Clear Clear      Specific Gravity, UA 1.023 1.003 - 1.030      pH, Urine 6.0 5.0 - 8.0      Protein, UA 30 (A) Negative mg/dL    Glucose, Ur Negative Negative mg/dL    Ketones, Urine Negative Negative mg/dL    Bilirubin, Urine Negative Negative      Blood, Urine Negative Negative      Urobilinogen, Urine 0.1 0.1 - 1.0 EU/dL    Nitrite, Urine Negative Negative      Leukocyte Esterase, Urine Negative Negative      BACTERIA, URINE Negative Negative /hpf    WBC, UA 0-4 0 - 4 /hpf    RBC, UA 5-10 0 - 5 /hpf    Calcium Oxalate 0-2    Basic Metabolic Panel w/ Reflex to MG     Collection Time: 09/27/24  4:48 AM   Result Value Ref Range    Sodium 142 136 - 145 mmol/L    Potassium 3.8 3.5 - 5.1 mmol/L    Chloride 110 (H) 97 - 108 mmol/L    CO2 23 21 - 32 mmol/L    Anion Gap 9 2 - 12 mmol/L    Glucose 82 65 - 100 mg/dL    BUN 27 (H) 6 - 20 mg/dL    Creatinine 0.64 0.55 - 1.02 mg/dL    BUN/Creatinine Ratio 42 (H) 12 - 20      Est, Glom Filt Rate 84 >60 ml/min/1.73m2    Calcium 9.5 8.5 - 10.1 mg/dL   CBC with Auto Differential    Collection Time: 09/27/24  4:48 AM   Result Value Ref Range    WBC 6.0 3.6 - 11.0 K/uL    RBC 3.64 (L) 3.80 - 5.20 M/uL    Hemoglobin 11.6 11.5 - 16.0 g/dL    Hematocrit 38.3 35.0 - 47.0 %    .2 (H) 80.0 - 99.0 FL    MCH 31.9 26.0 - 34.0 PG    MCHC 30.3 30.0 - 36.5 g/dL    RDW 15.9 (H) 11.5 - 14.5 %    Platelets 151 150 - 400 K/uL    Nucleated RBCs 0.0 0.0  WBC    nRBC 0.00 0.00 - 0.01 K/uL    Neutrophils % 62 32 - 75 %    Lymphocytes % 22 12 - 49 %    Monocytes % 11 5 - 13 %    Eosinophils % 4 0 - 7 %    Basophils % 1 0 - 1 %    Immature Granulocytes % 0 0 - 0.5 %    Neutrophils Absolute 3.7 1.8 - 8.0 K/UL    Lymphocytes Absolute 1.3 0.8 - 3.5 K/UL    Monocytes Absolute 0.7 0.0 - 1.0 K/UL    Eosinophils Absolute 0.2 0.0 - 0.4 K/UL    Basophils Absolute 0.1 0.0 - 0.1 K/UL    Immature Granulocytes Absolute 0.0 0.00 - 0.04 K/UL    Differential Type AUTOMATED         No orders to display          Discussion/MDM:     [] High (any 2)    A. Problems (any 1)  [] Acute/Chronic Illness/injury posing threat to life or bodily function:    [] Severe exacerbation of chronic illness:    ---------------------------------------------------------------------  B. Risk of Treatment (any 1)   [] Drugs/treatments that require intensive monitoring for toxicity include:    [] IV ABX requiring serial renal monitoring for nephrotoxicity:     [] IV Narcotic analgesia for adverse drug reaction  [] Aggressive IV diuresis requiring serial monitoring for renal impairment and

## 2024-09-27 NOTE — PROGRESS NOTES
4 Eyes Skin Assessment     NAME:  Ciarra Rutherford  YOB: 1934  MEDICAL RECORD NUMBER:  [id number]    The patient is being assessed for  Admission    I agree that at least one RN has performed a thorough Head to Toe Skin Assessment on the patient. ALL assessment sites listed below have been assessed.      Areas assessed by both nurses:    Head, Face, Ears, Shoulders, Back, Chest, Arms, Elbows, Hands, Sacrum. Buttock, Coccyx, Ischium, Legs. Feet and Heels, and Under Medical Devices         Does the Patient have a Wound? No noted wound(s) (PREVIOUS SCAR TO SACRUM, REDNESS TO R HEEL)       Dusty Prevention initiated by RN: Yes  Wound Care Orders initiated by RN: No    Pressure Injury (Stage 3,4, Unstageable, DTI, NWPT, and Complex wounds) if present, place Wound referral order by RN under : No    New Ostomies, if present place, Ostomy referral order under : No     Nurse 1 eSignature: Electronically signed by Rivera Barry RN on 9/26/24 at 10:05 PM EDT    **SHARE this note so that the co-signing nurse can place an eSignature**    Nurse 2 eSignature: Electronically signed by Arelis Cramer RN on 9/26/24 at 10:08 PM EDT

## 2024-09-28 VITALS
SYSTOLIC BLOOD PRESSURE: 135 MMHG | HEIGHT: 62 IN | TEMPERATURE: 98.2 F | WEIGHT: 144.4 LBS | DIASTOLIC BLOOD PRESSURE: 73 MMHG | RESPIRATION RATE: 18 BRPM | OXYGEN SATURATION: 93 % | BODY MASS INDEX: 26.57 KG/M2 | HEART RATE: 103 BPM

## 2024-09-28 LAB
ANION GAP SERPL CALC-SCNC: 5 MMOL/L (ref 2–12)
BASOPHILS # BLD: 0.1 K/UL (ref 0–0.1)
BASOPHILS NFR BLD: 1 % (ref 0–1)
BUN SERPL-MCNC: 29 MG/DL (ref 6–20)
BUN/CREAT SERPL: 43 (ref 12–20)
CA-I BLD-MCNC: 9.1 MG/DL (ref 8.5–10.1)
CHLORIDE SERPL-SCNC: 111 MMOL/L (ref 97–108)
CO2 SERPL-SCNC: 27 MMOL/L (ref 21–32)
CREAT SERPL-MCNC: 0.68 MG/DL (ref 0.55–1.02)
DIFFERENTIAL METHOD BLD: ABNORMAL
EOSINOPHIL # BLD: 0.1 K/UL (ref 0–0.4)
EOSINOPHIL NFR BLD: 1 % (ref 0–7)
ERYTHROCYTE [DISTWIDTH] IN BLOOD BY AUTOMATED COUNT: 15.3 % (ref 11.5–14.5)
GLUCOSE SERPL-MCNC: 144 MG/DL (ref 65–100)
HCT VFR BLD AUTO: 38.5 % (ref 35–47)
HGB BLD-MCNC: 12.4 G/DL (ref 11.5–16)
IMM GRANULOCYTES # BLD AUTO: 0 K/UL (ref 0–0.04)
IMM GRANULOCYTES NFR BLD AUTO: 0 % (ref 0–0.5)
LYMPHOCYTES # BLD: 1.3 K/UL (ref 0.8–3.5)
LYMPHOCYTES NFR BLD: 15 % (ref 12–49)
MCH RBC QN AUTO: 31.8 PG (ref 26–34)
MCHC RBC AUTO-ENTMCNC: 32.2 G/DL (ref 30–36.5)
MCV RBC AUTO: 98.7 FL (ref 80–99)
MONOCYTES # BLD: 0.8 K/UL (ref 0–1)
MONOCYTES NFR BLD: 9 % (ref 5–13)
NEUTS SEG # BLD: 6.5 K/UL (ref 1.8–8)
NEUTS SEG NFR BLD: 74 % (ref 32–75)
NRBC # BLD: 0 K/UL (ref 0–0.01)
NRBC BLD-RTO: 0 PER 100 WBC
PLATELET # BLD AUTO: 284 K/UL (ref 150–400)
PMV BLD AUTO: 10.3 FL (ref 8.9–12.9)
POTASSIUM SERPL-SCNC: 3.4 MMOL/L (ref 3.5–5.1)
RBC # BLD AUTO: 3.9 M/UL (ref 3.8–5.2)
SODIUM SERPL-SCNC: 143 MMOL/L (ref 136–145)
WBC # BLD AUTO: 8.8 K/UL (ref 3.6–11)

## 2024-09-28 PROCEDURE — 36415 COLL VENOUS BLD VENIPUNCTURE: CPT

## 2024-09-28 PROCEDURE — 2580000003 HC RX 258: Performed by: STUDENT IN AN ORGANIZED HEALTH CARE EDUCATION/TRAINING PROGRAM

## 2024-09-28 PROCEDURE — 80048 BASIC METABOLIC PNL TOTAL CA: CPT

## 2024-09-28 PROCEDURE — 85025 COMPLETE CBC W/AUTO DIFF WBC: CPT

## 2024-09-28 RX ADMIN — SODIUM CHLORIDE, PRESERVATIVE FREE 10 ML: 5 INJECTION INTRAVENOUS at 09:24

## 2024-09-28 ASSESSMENT — PAIN SCALES - GENERAL: PAINLEVEL_OUTOF10: 0

## 2024-09-28 NOTE — PLAN OF CARE
Problem: Pain  Goal: Verbalizes/displays adequate comfort level or baseline comfort level  Outcome: Progressing     Problem: Safety - Adult  Goal: Free from fall injury  9/28/2024 0049 by Cecy Mike RN  Outcome: Progressing     Problem: Skin/Tissue Integrity  Goal: Absence of new skin breakdown  Description: 1.  Monitor for areas of redness and/or skin breakdown  2.  Assess vascular access sites hourly  3.  Every 4-6 hours minimum:  Change oxygen saturation probe site  4.  Every 4-6 hours:  If on nasal continuous positive airway pressure, respiratory therapy assess nares and determine need for appliance change or resting period.  Outcome: Progressing     Problem: Gastrointestinal - Adult  Goal: Maintains adequate nutritional intake  Reactivated

## 2024-09-28 NOTE — CONSULTS
Nutrition Note    RD consulted for TF management    Modify current TF order to Jevity 1.5 (standard formula with fiber) @ 50 mL/hr   Provides 1800 kcals (27 kcals/kg), 77 g pro (1.2 g/kg), and 912 mL free water  Add free water flush of 185 mL QID  Total free water daily: 1652 mL  Monitor TF tolerance, bowel fxn, and skin integrity.    Pt w/ PMH of A fib, Parkinson's, demetnia, HTN, and FTT. NKFA. Per EMR, pt weighed 65.8kg (144.7#) May 2023; today via bedscale, pt weighed 65.5 kg (144.1#); no evidence of recent weightloss. Pt was receiving TF via PEG at [facility]. Pt is unsure of formula or rate. Pt presented w/ dislodged PEG, was replaced y'day, making d/c arrangements.    Estimated Daily Needs:   6364-6469 kcals (25-30 kcals/kg)  52-66 g protein (0.8-1.0 g/kg)  1650 mL fluid     Electronically signed by Arelis Aly RD on 9/28/24 at 9:56 AM EDT    Contact: [phone]

## 2024-09-28 NOTE — PROGRESS NOTES
Attempted to call daughter Brenda Del Rosario ([phone]) and left a voicemail informing her about patient's discharge plan for today Left [facility] unit's number for her to call back for any questions.     12:55 PM - received call back from Brenda Del Rosario. Update give.    Discharge plan of care/case management plan validated with provider discharge order. Discharge back to [facility] rehabilitation. Called unit accepting patient and talked to Staff Divya; All pertinent questions answered. Maintained PEG tube and removed telemetry box and IV access. Transportation set up done with [facility]; [place] at 3:30 PM today.

## 2024-09-28 NOTE — CONSULTS
Gastroenterology Consult     Referring Physician: None, None     Consult Date: 9/28/2024     Subjective:     Chief Complaint: G tube complications     History of Present Illness: Ciarra Rutherford is a 89 y.o. female with PMH of Parkinson's disease, dementia, failure to thrive, hypertension, and atrial fibrillation who is seen in consultation for dislodged PEG tube.    Patient was admitted to the hospital with dislodged gastrostomy tube. Patient is a resident at [facility]. It is unclear when it happened. Patient not sure of the usual size of her PEG tube.     9/27:  PEG tube placement was successful. Patient tolerated procedure well.   Impression:         -  Abnormal esophageal motility.         -  Gastritis, characterized by erythema.         -  An externally removable PEG placement was successfully completed.         -  Normal examined duodenum.         -  No specimens collected.    9/28:  Patient doing well today. Tolerating tube feedings well without issue. Denies abdominal pain. No nausea/vomiting.   Dietitian consulted for TF management. Discharge summary in for patient.   Past Medical History:   Diagnosis Date    Atrial fibrillation (HCC)     Cervicalgia     Dementia (HCC)     Failure to thrive in adult     Hypertension     Parkinson disease (HCC)     Pressure ulcer, buttock      Past Surgical History:   Procedure Laterality Date    UPPER GASTROINTESTINAL ENDOSCOPY N/A 9/27/2024    ESOPHAGOGASTRODUODENOSCOPY PERCUTANEOUS ENDOSCOPIC GASTROSTOMY TUBE INSERTION performed by Ankita Khanna MD at [facility] ENDOSCOPY      History reviewed. No pertinent family history.  Social History     Tobacco Use    Smoking status: Former     Types: Cigarettes    Smokeless tobacco: Never   Substance Use Topics    Alcohol use: Not on file      Allergies   Allergen Reactions    Iodine Swelling     Current Facility-Administered Medications   Medication Dose Route Frequency    sodium chloride flush 0.9 % injection 5-40 mL

## 2024-09-28 NOTE — PLAN OF CARE
Problem: Safety - Adult  Goal: Free from fall injury  9/28/2024 0049 by Cecy Mike, RN  Outcome: Progressing   Bed locked and set to lowest position. Bedside table next to patient. Call light within patient reach.

## 2024-09-28 NOTE — CARE COORDINATION
Transition of Care Plan:    RUR: 15%  Prior Level of Functioning: MOD Dep  Disposition: SNF  If SNF or IPR: Date FOC offered: 9/27/24  Date FOC received: 9/27/24  Accepting facility: [facility]  Date authorization started with reference number: NA  Date authorization received and expires: NA  Follow up appointments: PER SNF  DME needed: NONE  Transportation at discharge: [facility]  IM/IMM Medicare/ letter given: YES  Is patient a [place] and connected with [facility]? NO   If yes, was  transfer form completed and VA notified?   Caregiver Contact: CY KELLY  Discharge Caregiver contacted prior to discharge? YES  Care Conference needed? NO  Barriers to discharge: NONE    Pt ready for dc, will return to [facility] today, going to room 22B, number for report, [phone]. [facility] to assist with transport. CM has lvm w/ pt's daughter.

## 2024-09-28 NOTE — DISCHARGE SUMMARY
Physician Discharge Summary     Patient ID:    Ciarra Rutherford  [id number]  89 y.o.  4/19/1934    Admit date: 9/26/2024    Discharge date : 9/28/2024      Final Diagnoses:   Dislodged gastrostomy tube [T85.528A]  PEG tube malfunction (HCC) [K94.23]      Reason for Hospitalization:    PEG tube dislodgment    Hospital Course:   Ciarra Rutherford is a 89 y.o. female with multiple medical problems including hyper, Parkinson's disease, atrial fibrillation, failure to thrive presents with displaced PEG tube.  Patient comes from [facility] rehab.  Patient is a poor historian.     In ED, afebrile and hemodynamically stable.  They did report A-fib with RVR on telemetry which revealed a heart rate in 110s- 130s.     GI was consulted for PEG tube pulled out.    Patient underwent successful PEG tube placement with GI on 9/27/2024.  Tolerated procedure well.  Tube feeds were started, tolerating well.    On the day of the discharge, patient hemodynamically stable.  Denies any acute pain.  PEG tube in place.          Discharge Medications:      Medication List        CONTINUE taking these medications      acetaminophen 325 MG tablet  Commonly known as: TYLENOL     amitriptyline 25 MG tablet  Commonly known as: ELAVIL     atenolol 25 MG tablet  Commonly known as: TENORMIN     bisacodyl 10 MG suppository  Commonly known as: DULCOLAX     carbidopa-levodopa  MG per tablet  Commonly known as: SINEMET     cloNIDine 0.1 MG tablet  Commonly known as: CATAPRES     dilTIAZem 180 MG extended release capsule  Commonly known as: CARDIZEM CD     latanoprost 0.005 % ophthalmic solution  Commonly known as: XALATAN     lidocaine 5 %  Commonly known as: LIDODERM     melatonin 3 MG Tabs tablet     sertraline 50 MG tablet  Commonly known as: ZOLOFT                Follow up Care:    Follow-up Information    None              Diet:   Tube feeds    Disposition:  Skilled nursing facility.    Advanced Directive:   FULL

## 2024-09-30 LAB
BACTERIA SPEC CULT: NORMAL
Lab: NORMAL

## (undated) DEVICE — ENDOVIVE SFT PEG KIT PULL WENFIT 20F BX2